# Patient Record
Sex: FEMALE | Race: WHITE | NOT HISPANIC OR LATINO | Employment: FULL TIME | URBAN - METROPOLITAN AREA
[De-identification: names, ages, dates, MRNs, and addresses within clinical notes are randomized per-mention and may not be internally consistent; named-entity substitution may affect disease eponyms.]

---

## 2018-03-31 ENCOUNTER — HOSPITAL ENCOUNTER (INPATIENT)
Facility: CLINIC | Age: 23
LOS: 17 days | Discharge: HOME | DRG: 885 | End: 2018-04-18
Attending: PSYCHIATRY & NEUROLOGY | Admitting: PSYCHIATRY & NEUROLOGY
Payer: COMMERCIAL

## 2018-03-31 DIAGNOSIS — F12.10 CANNABIS ABUSE, CONTINUOUS: ICD-10-CM

## 2018-03-31 DIAGNOSIS — F14.90 COCAINE USE: ICD-10-CM

## 2018-03-31 DIAGNOSIS — F39 EPISODIC MOOD DISORDER (H): ICD-10-CM

## 2018-03-31 DIAGNOSIS — R45.851 SUICIDAL IDEATION: ICD-10-CM

## 2018-03-31 DIAGNOSIS — Z72.0 TOBACCO ABUSE: Primary | ICD-10-CM

## 2018-03-31 DIAGNOSIS — F32.89 MINOR DEPRESSIVE DISORDER: ICD-10-CM

## 2018-03-31 DIAGNOSIS — F12.90 MARIJUANA USE: ICD-10-CM

## 2018-03-31 DIAGNOSIS — F14.159: ICD-10-CM

## 2018-03-31 LAB
AMPHETAMINES UR QL SCN: NEGATIVE
BARBITURATES UR QL: NEGATIVE
BENZODIAZ UR QL: NEGATIVE
CANNABINOIDS UR QL SCN: POSITIVE
COCAINE UR QL: POSITIVE
ETHANOL UR QL SCN: NEGATIVE
HCG UR QL: NEGATIVE
OPIATES UR QL SCN: NEGATIVE

## 2018-03-31 PROCEDURE — 99285 EMERGENCY DEPT VISIT HI MDM: CPT | Mod: Z6 | Performed by: PSYCHIATRY & NEUROLOGY

## 2018-03-31 PROCEDURE — 99285 EMERGENCY DEPT VISIT HI MDM: CPT | Mod: 25 | Performed by: PSYCHIATRY & NEUROLOGY

## 2018-03-31 PROCEDURE — 96360 HYDRATION IV INFUSION INIT: CPT | Performed by: PSYCHIATRY & NEUROLOGY

## 2018-03-31 PROCEDURE — 81025 URINE PREGNANCY TEST: CPT | Performed by: PSYCHIATRY & NEUROLOGY

## 2018-03-31 PROCEDURE — 90791 PSYCH DIAGNOSTIC EVALUATION: CPT

## 2018-03-31 PROCEDURE — 96361 HYDRATE IV INFUSION ADD-ON: CPT | Performed by: PSYCHIATRY & NEUROLOGY

## 2018-03-31 PROCEDURE — 80320 DRUG SCREEN QUANTALCOHOLS: CPT | Performed by: PSYCHIATRY & NEUROLOGY

## 2018-03-31 PROCEDURE — 80307 DRUG TEST PRSMV CHEM ANLYZR: CPT | Performed by: PSYCHIATRY & NEUROLOGY

## 2018-03-31 ASSESSMENT — ENCOUNTER SYMPTOMS
BACK PAIN: 0
FEVER: 0
DYSPHORIC MOOD: 1
HALLUCINATIONS: 1
ABDOMINAL PAIN: 0
SHORTNESS OF BREATH: 0
DIZZINESS: 0
CHEST TIGHTNESS: 0
NERVOUS/ANXIOUS: 1

## 2018-03-31 NOTE — IP AVS SNAPSHOT
MRN:[id number]                      After Visit Summary   3/31/2018    Palma Kim    MRN: [id number]           Patient Information     Date Of Birth          1995        Designated Caregiver       Most Recent Value    Caregiver    Will someone help with your care after discharge? no      About your hospital stay     You were admitted on:  April 1, 2018 You last received care in the:  [facility]    You were discharged on:  April 18, 2018       Who to Call     For medical emergencies, please call 911.  For non-urgent questions about your medical care, please call your primary care provider or clinic, [phone]          Attending Provider     Provider Specialty    Andrea Sanchez MD Emergency Medicine    Heather, Erma Turner MD Psychiatry    Dongre, Dinesh Mckeon MD Psychiatry       Primary Care Provider Office Phone # Fax #    Teodora SimsHEMA [phone]      Further instructions from your care team       ..Behavioral Discharge Planning and Instructions      Summary: You were admitted on 3/31/2018 to Station [address] [place] [address] due to Suicidal Ideation.  You were treated by  Erma Romero MD. and discharged on 04/18/2018 to Substance Abuse Treatment Program [facility]     Principal Diagnosis:   Psychosis, unspecified (primary psychotic illness vs schizoaffective disorder vs substance induced)  Substance induced mood disorder  Cocaine Use Disorder, moderate to severe  Cannabis Use Disorder, severe    [facility]    Health Care Follow-up Appointments:   Inpatient Treatment  Date: 04/18/2018  Time:       Provider: Iraj Levin  Address: [facility] [place] [address] [place] [address]  Phone:[phone]   [facility] has faxed the Discharge Summary and AVS to this provider at Fax: [phone]        Attend all scheduled appointments with your outpatient providers. Call at least 24 hours in advance if you need to reschedule an  "appointment to ensure continued access to your outpatient providers.   Major Treatments, Procedures and Findings: You were provided with: a psychiatric assessment, assessed for medical stability, medication evaluation and/or management, group therapy, art therapy, milieu management, medical interventions and skills/OT groups.    Symptoms to Report: If you experience any of the following symptoms please report them right away to your provider or to family/friends; feeling more aggressive, increased confusion, losing more sleep, mood getting worse or thoughts of suicide.    Early warning signs can include: Early warning signs that could signal a potential relapse could include but not limited to the following; increased depression or anxiety sleep disturbances increased thoughts or behaviors of suicide or self-harm increased unusual thinking, such as paranoia or hearing voices.     Safety and Wellness:  Take all medicines as directed.  Make no changes unless your doctor suggests them. Follow treatment recommendations.  Refrain from alcohol and non-prescribed drugs. If there is a concern for safety, call 911.    Resources: Mental health crisis response for your [facility] is offered 24 hours a day, 7 days a week. A trained counselor will assess your current situation, offer support and counseling and connect you with local resources. Please call  [facility] Crisis Response [phone]    Crisis Intervention: 108.388.8390 or 594-627-8702 (TTY: 466.236.7120).  Call anytime for help.  [employer] [place] on Mental Illness (www.[place].amrtina.org): 817.941.4819 or 661-549-4049.  Mental Health Consumer/Survivor Network of [place] (www.mhcsn.net): 988.286.5563 or 092-581-6708  Mental Health Association of [place] (www.mentalhealth.org): 663.461.4517 or 201-610-0297  Self- Management and Recovery Training., SMART-- Toll free: 347.969.9910  www.[employer]iveWorx  Text 4 Life: txt \"LIFE\" to 37575 for immediate support and crisis " "intervention  Crisis text line: Text \"[place]\" to 446461. Free, confidential, .  Crisis Intervention: 527.316.5233 or 482-346-1884. Call anytime for help.     The treatment team has appreciated the opportunity to work with you. Palma, please take care and make your recovery a daily recovery. If you have any questions or concerns our unit number is [phone].  You will be receiving a follow-up phone call within the next three days from a representative from behavioral health.  You have identified the best phone number to reach you as [phone] (home)         Pending Results     No orders found from 3/29/2018 to 2018.            Admission Information     Date & Time Provider Department Dept. Phone    3/31/2018 Dinesh Malik MD Young Adult [facility] [phone]      Your Vitals Were     Blood Pressure Pulse Temperature Respirations Weight Last Period    123/65 78 96.7  F (35.9  C) (Tympanic) 16 66.8 kg (147 lb 3.2 oz) 2018    Pulse Oximetry                   99%           INFRARED IMAGING SYSTEMShart Information     Vgift lets you send messages to your doctor, view your test results, renew your prescriptions, schedule appointments and more. To sign up, go to [email or web address] . Click on \"Log in\" on the left side of the screen, which will take you to the Welcome page. Then click on \"Sign up Now\" on the right side of the page.     You will be asked to enter the access code listed below, as well as some personal information. Please follow the directions to create your username and password.     Your access code is: [email or web address]  Expires: 2018 10:21 AM     Your access code will  in 90 days. If you need help or a new code, please call your [facility] or [phone].        Care EveryWhere ID     This is your Care EveryWhere ID. This could be used by other organizations to access your [place] medical records  [id number]        Equal Access to Services     MANASA CISNEROS AH: Hadii " osito Topete, waaxda luqadaha, qaybta kaalmada adejoaquin, waxtamanna cherie mcclellandmelissazaid meeks. So [facility] [phone].    ATENCIÓN: Si habla español, tiene a mcneil disposición servicios gratuitos de asistencia lingüística. Andreiame al [phone].    We comply with applicable federal civil rights laws and [place] laws. We do not discriminate on the basis of race, color, national origin, age, disability, sex, sexual orientation, or gender identity.               Review of your medicines      START taking        Dose / Directions    hydrOXYzine 50 MG tablet   Commonly known as:  ATARAX   Used for:  Episodic mood disorder (H)        Dose:  50 mg   Take 1 tablet (50 mg) by mouth every 4 hours as needed for anxiety   Quantity:  30 tablet   Refills:  1       nicotine polacrilex 2 MG gum   Commonly known as:  NICORETTE        Dose:  2 mg   Place 1 each (2 mg) inside cheek every hour as needed for smoking cessation   Quantity:  90 tablet   Refills:  1       * OLANZapine 10 MG tablet   Commonly known as:  zyPREXA   Used for:  Episodic mood disorder (H)        Dose:  10 mg   Take 1 tablet (10 mg) by mouth At Bedtime   Quantity:  30 tablet   Refills:  1       * OLANZapine 5 MG tablet   Commonly known as:  zyPREXA   Used for:  Episodic mood disorder (H)        Dose:  5 mg   Take 1 tablet (5 mg) by mouth daily   Quantity:  30 tablet   Refills:  1       * Notice:  This list has 2 medication(s) that are the same as other medications prescribed for you. Read the directions carefully, and ask your doctor or other care provider to review them with you.         Where to get your medicines      These medications were sent to [facility] [place], [place] - [address]  [address] [address]     Phone:  [phone]     hydrOXYzine 50 MG tablet    nicotine polacrilex 2 MG gum    OLANZapine 10 MG tablet    OLANZapine 5 MG tablet                Protect others around you: Learn how to  safely use, store and throw away your medicines at www.disposemymeds.org.             Medication List: This is a list of all your medications and when to take them. Check marks below indicate your daily home schedule. Keep this list as a reference.      Medications           Morning Afternoon Evening Bedtime As Needed    hydrOXYzine 50 MG tablet   Commonly known as:  ATARAX   Take 1 tablet (50 mg) by mouth every 4 hours as needed for anxiety   Last time this was given:  50 mg on 4/15/2018  8:10 PM                                nicotine polacrilex 2 MG gum   Commonly known as:  NICORETTE   Place 1 each (2 mg) inside cheek every hour as needed for smoking cessation   Last time this was given:  2 mg on 4/16/2018  9:30 AM                                * OLANZapine 10 MG tablet   Commonly known as:  zyPREXA   Take 1 tablet (10 mg) by mouth At Bedtime   Last time this was given:  5 mg on 4/16/2018  9:30 AM                                * OLANZapine 5 MG tablet   Commonly known as:  zyPREXA   Take 1 tablet (5 mg) by mouth daily   Last time this was given:  5 mg on 4/16/2018  9:30 AM                                * Notice:  This list has 2 medication(s) that are the same as other medications prescribed for you. Read the directions carefully, and ask your doctor or other care provider to review them with you.

## 2018-03-31 NOTE — IP AVS SNAPSHOT
[facility]    [facility] [address]    [address] [facility] [address]    Phone:  [phone]                                       After Visit Summary   3/31/2018    Palma Kim    MRN: [id number]           After Visit Summary Signature Page     I have received my discharge instructions, and my questions have been answered. I have discussed any challenges I see with this plan with the nurse or doctor.    ..........................................................................................................................................  Patient/Patient Representative Signature      ..........................................................................................................................................  Patient Representative Print Name and Relationship to Patient    ..................................................               ................................................  Date                                            Time    ..........................................................................................................................................  Reviewed by Signature/Title    ...................................................              ..............................................  Date                                                            Time

## 2018-03-31 NOTE — IP AVS SNAPSHOT
YOUNG ADULT INPATIENT MENTAL [facility]: [phone]                                              INTERAGENCY TRANSFER FORM - LAB / IMAGING / EKG / EMG RESULTS   3/31/2018                    Hospital Admission Date: 3/31/2018  RAYMON BURGOS   : 1995  Sex: Female        Attending Provider: Dinesh Malik MD     Allergies:  Penicillins    Infection:  None   Service:  MENTAL HEALT    Ht:  --   Wt:  66.8 kg (147 lb 3.2 oz)   Admission Wt:  65.3 kg (144 lb)    BMI:  --   BSA:  --            Patient PCP Information     Provider PCP Type    HEMA Ponce CNP General      Unresulted Labs     None      Encounter-Level Documents:     There are no encounter-level documents.      Order-Level Documents:     There are no order-level documents.

## 2018-04-01 PROBLEM — F39 MOOD DISORDER (H): Status: ACTIVE | Noted: 2018-04-01

## 2018-04-01 LAB
ALBUMIN SERPL-MCNC: 4.2 G/DL (ref 3.4–5)
ALP SERPL-CCNC: 52 U/L (ref 40–150)
ALT SERPL W P-5'-P-CCNC: 12 U/L (ref 0–50)
ANION GAP SERPL CALCULATED.3IONS-SCNC: 11 MMOL/L (ref 3–14)
AST SERPL W P-5'-P-CCNC: 12 U/L (ref 0–45)
BASOPHILS # BLD AUTO: 0 10E9/L (ref 0–0.2)
BASOPHILS NFR BLD AUTO: 0.4 %
BILIRUB SERPL-MCNC: 0.7 MG/DL (ref 0.2–1.3)
BUN SERPL-MCNC: 9 MG/DL (ref 7–30)
CALCIUM SERPL-MCNC: 8.8 MG/DL (ref 8.5–10.1)
CHLORIDE SERPL-SCNC: 109 MMOL/L (ref 94–109)
CHOLEST SERPL-MCNC: 131 MG/DL
CO2 SERPL-SCNC: 21 MMOL/L (ref 20–32)
CREAT SERPL-MCNC: 0.68 MG/DL (ref 0.52–1.04)
DIFFERENTIAL METHOD BLD: NORMAL
EOSINOPHIL # BLD AUTO: 0.2 10E9/L (ref 0–0.7)
EOSINOPHIL NFR BLD AUTO: 2.4 %
ERYTHROCYTE [DISTWIDTH] IN BLOOD BY AUTOMATED COUNT: 14.5 % (ref 10–15)
GFR SERPL CREATININE-BSD FRML MDRD: >90 ML/MIN/1.7M2
GLUCOSE SERPL-MCNC: 71 MG/DL (ref 70–99)
HCT VFR BLD AUTO: 38 % (ref 35–47)
HDLC SERPL-MCNC: 72 MG/DL
HGB BLD-MCNC: 12.3 G/DL (ref 11.7–15.7)
IMM GRANULOCYTES # BLD: 0 10E9/L (ref 0–0.4)
IMM GRANULOCYTES NFR BLD: 0.3 %
LDLC SERPL CALC-MCNC: 45 MG/DL
LYMPHOCYTES # BLD AUTO: 2.9 10E9/L (ref 0.8–5.3)
LYMPHOCYTES NFR BLD AUTO: 37.3 %
MCH RBC QN AUTO: 29.4 PG (ref 26.5–33)
MCHC RBC AUTO-ENTMCNC: 32.4 G/DL (ref 31.5–36.5)
MCV RBC AUTO: 91 FL (ref 78–100)
MONOCYTES # BLD AUTO: 0.8 10E9/L (ref 0–1.3)
MONOCYTES NFR BLD AUTO: 9.6 %
NEUTROPHILS # BLD AUTO: 3.9 10E9/L (ref 1.6–8.3)
NEUTROPHILS NFR BLD AUTO: 50 %
NONHDLC SERPL-MCNC: 59 MG/DL
NRBC # BLD AUTO: 0 10*3/UL
NRBC BLD AUTO-RTO: 0 /100
PLATELET # BLD AUTO: 307 10E9/L (ref 150–450)
POTASSIUM SERPL-SCNC: 3.6 MMOL/L (ref 3.4–5.3)
PROT SERPL-MCNC: 7.5 G/DL (ref 6.8–8.8)
RBC # BLD AUTO: 4.18 10E12/L (ref 3.8–5.2)
SODIUM SERPL-SCNC: 141 MMOL/L (ref 133–144)
TRIGL SERPL-MCNC: 72 MG/DL
TSH SERPL DL<=0.005 MIU/L-ACNC: 1.38 MU/L (ref 0.4–4)
WBC # BLD AUTO: 7.8 10E9/L (ref 4–11)

## 2018-04-01 PROCEDURE — 80061 LIPID PANEL: CPT | Performed by: PSYCHIATRY & NEUROLOGY

## 2018-04-01 PROCEDURE — 99223 1ST HOSP IP/OBS HIGH 75: CPT | Mod: AI | Performed by: PSYCHIATRY & NEUROLOGY

## 2018-04-01 PROCEDURE — 85025 COMPLETE CBC W/AUTO DIFF WBC: CPT | Performed by: PSYCHIATRY & NEUROLOGY

## 2018-04-01 PROCEDURE — 80053 COMPREHEN METABOLIC PANEL: CPT | Performed by: PSYCHIATRY & NEUROLOGY

## 2018-04-01 PROCEDURE — H2032 ACTIVITY THERAPY, PER 15 MIN: HCPCS

## 2018-04-01 PROCEDURE — 36415 COLL VENOUS BLD VENIPUNCTURE: CPT | Performed by: PSYCHIATRY & NEUROLOGY

## 2018-04-01 PROCEDURE — 84443 ASSAY THYROID STIM HORMONE: CPT | Performed by: PSYCHIATRY & NEUROLOGY

## 2018-04-01 PROCEDURE — 12400007 ZZH R&B MH INTERMEDIATE UMMC

## 2018-04-01 PROCEDURE — 25000132 ZZH RX MED GY IP 250 OP 250 PS 637: Performed by: PSYCHIATRY & NEUROLOGY

## 2018-04-01 RX ORDER — HYDROXYZINE HYDROCHLORIDE 25 MG/1
25 TABLET, FILM COATED ORAL EVERY 4 HOURS PRN
Status: DISCONTINUED | OUTPATIENT
Start: 2018-04-01 | End: 2018-04-02

## 2018-04-01 RX ORDER — OLANZAPINE 5 MG/1
5 TABLET, ORALLY DISINTEGRATING ORAL ONCE
Status: COMPLETED | OUTPATIENT
Start: 2018-04-01 | End: 2018-04-01

## 2018-04-01 RX ORDER — OLANZAPINE 5 MG/1
5 TABLET, ORALLY DISINTEGRATING ORAL AT BEDTIME
Status: DISCONTINUED | OUTPATIENT
Start: 2018-04-01 | End: 2018-04-03

## 2018-04-01 RX ADMIN — OLANZAPINE 5 MG: 5 TABLET, ORALLY DISINTEGRATING ORAL at 12:47

## 2018-04-01 RX ADMIN — NICOTINE POLACRILEX 4 MG: 2 GUM, CHEWING ORAL at 12:47

## 2018-04-01 ASSESSMENT — ACTIVITIES OF DAILY LIVING (ADL)
GROOMING: PROMPTS
DRESS: SCRUBS (BEHAVIORAL HEALTH);PROMPTS
ORAL_HYGIENE: PROMPTS
DRESS: SCRUBS (BEHAVIORAL HEALTH)
LAUNDRY: UNABLE TO COMPLETE
GROOMING: PROMPTS;WITH ASSISTANCE
ORAL_HYGIENE: PROMPTS;WITH ASSISTANCE

## 2018-04-01 NOTE — PROGRESS NOTES
Pt's mother called unit this morning. In chart JABIER was signed for incorrect person therefore writer checked in with Pt before speaking to mom and received verbal confirmation to speak to her. When speaking to mom, mom stated that Pt has recently denied her  side of her mixed identity ( and ) and mom began to cry. Pt verbalized and approval for mom to come during visiting hours. Staff attempted to receive JABIER when Pt was awake, Pt denied. Pt appeared to be disorganized, responding to internal stimuli and having paranoia related to their identity, interactions with others, There is no JABIER signed for mom at this time.

## 2018-04-01 NOTE — PROGRESS NOTES
"   04/01/18 0515   Patient Belongings   Did you bring any home meds/supplements to the hospital?  No   Patient Belongings clothing;necklace;other (see comments)   Disposition of Belongings Locker   Belongings Search Yes     Belongings in Pt Bin:  Shorts, Sweatpants, T-Shirt, Cell Phone, Slippers, Robe, Teresa Pack, Portable Speaker, Phone  (2), Head Phones, 2 necklaces, Breathe Stone, Notebook, [employer] Card, Bag of Small rocks?  Items brought for Patient on 04/17/18: Two books: \" [employer] Start  Of Home\", and \"Your Healing is Killing me\"    A               Admission:  I am responsible for any personal items that are not sent to the safe or pharmacy.  [facility] is not responsible for loss, theft or damage of any property in my possession.    Signature:  _________________________________ Date: _______  Time: _____                                              Staff Signature:  ____________________________ Date: ________  Time: _____      2nd Staff person, if patient is unable/unwilling to sign:    Signature: ________________________________ Date: ________  Time: _____     Discharge:  [facility] has returned all of my personal belongings:    Signature: _________________________________ Date: ________  Time: _____                                          Staff Signature:  ____________________________ Date: ________  Time: _____         "
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
## 2018-04-01 NOTE — PROGRESS NOTES
Patient was lying on the floor at the time--verbalized yes that this writer could talk to her father.  She then gave a vague signature on the JABIER for her father.

## 2018-04-02 PROCEDURE — 99233 SBSQ HOSP IP/OBS HIGH 50: CPT | Performed by: PSYCHIATRY & NEUROLOGY

## 2018-04-02 PROCEDURE — 12400003 ZZH R&B MH CRITICAL UMMC

## 2018-04-02 PROCEDURE — 25000132 ZZH RX MED GY IP 250 OP 250 PS 637: Performed by: NURSE PRACTITIONER

## 2018-04-02 PROCEDURE — 25000132 ZZH RX MED GY IP 250 OP 250 PS 637: Performed by: PSYCHIATRY & NEUROLOGY

## 2018-04-02 RX ORDER — OLANZAPINE 5 MG/1
5-10 TABLET ORAL
Status: DISCONTINUED | OUTPATIENT
Start: 2018-04-02 | End: 2018-04-02

## 2018-04-02 RX ORDER — OLANZAPINE 10 MG/2ML
5-10 INJECTION, POWDER, FOR SOLUTION INTRAMUSCULAR
Status: DISCONTINUED | OUTPATIENT
Start: 2018-04-02 | End: 2018-04-18 | Stop reason: HOSPADM

## 2018-04-02 RX ORDER — HYDROXYZINE HYDROCHLORIDE 50 MG/1
50 TABLET, FILM COATED ORAL EVERY 4 HOURS PRN
Status: DISCONTINUED | OUTPATIENT
Start: 2018-04-02 | End: 2018-04-18 | Stop reason: HOSPADM

## 2018-04-02 RX ORDER — OLANZAPINE 5 MG/1
5-10 TABLET ORAL
Status: DISCONTINUED | OUTPATIENT
Start: 2018-04-02 | End: 2018-04-18 | Stop reason: HOSPADM

## 2018-04-02 RX ADMIN — OLANZAPINE 5 MG: 5 TABLET, ORALLY DISINTEGRATING ORAL at 19:00

## 2018-04-02 RX ADMIN — HYDROXYZINE HYDROCHLORIDE 25 MG: 25 TABLET, FILM COATED ORAL at 17:48

## 2018-04-02 RX ADMIN — HYDROXYZINE HYDROCHLORIDE 50 MG: 50 TABLET, FILM COATED ORAL at 23:15

## 2018-04-02 RX ADMIN — OLANZAPINE 10 MG: 5 TABLET, FILM COATED ORAL at 23:15

## 2018-04-02 ASSESSMENT — ACTIVITIES OF DAILY LIVING (ADL)
ORAL_HYGIENE: PROMPTS
DRESS: SCRUBS (BEHAVIORAL HEALTH)
DRESS: SCRUBS (BEHAVIORAL HEALTH);INDEPENDENT
GROOMING: PROMPTS
ORAL_HYGIENE: PROMPTS
GROOMING: PROMPTS
LAUNDRY: UNABLE TO COMPLETE

## 2018-04-02 NOTE — PROGRESS NOTES
YIMI, Pt was withdrawn,isolated,decline to respond to check in     04/02/18 1349   Behavioral Health   Hallucinations (YIMI, Pt was withdrawn,isolated,decline to respond to checkin)   Thinking (YIMI, Pt was withdrawn,isolated,decline to respond to checkin)   Orientation (YIMI, Pt was withdrawn,isolated,decline to respond to checkin)   Memory (YIMI, Pt was withdrawn,isolated,decline to respond to checkin)   Insight poor  (YIMI, Pt was withdrawn,isolated,decline to respond to checkin)   Judgement impaired  (YIMI, Pt was withdrawn,isolated,decline to respond to checkin)   Eye Contact out of corner of eyes;at examiner   Affect full range affect;irritable;other (see comments)   Mood (YIMI, Pt was withdrawn,isolated,decline to respond to checkin)   Suicidality (YIMI, Pt was withdrawn,isolated,decline to respond to checkin)   1. Wish to be Dead (YIMI, Pt was withdrawn,isolated,decline to respond to checkin)   2. Non-Specific Active Suicidal Thoughts  (YIMI, Pt was withdrawn,isolated,decline to respond to checkin)   Self Injury (YIMI, Pt was withdrawn,isolated,decline to respond to checkin)   Elopement (YIMI, Pt was withdrawn,isolated,decline to respond to checkin)   Activity isolative;withdrawn   Medication Sensitivity (YIMI, Pt was withdrawn,isolated,decline to respond to checkin)   Psychomotor / Gait balanced;steady   Psycho Education   Type of Intervention (YIMI, Pt was withdrawn,isolated,decline to respond to checkin)   Response (YIMI, Pt was withdrawn,isolated,decline to respond to checkin)   Hours (YIMI, Pt was withdrawn,isolated,decline to respond to checkin)   Activities of Daily Living   Hygiene/Grooming prompts   Oral Hygiene prompts   Dress scrubs (behavioral health);independent   Laundry unable to complete   Room Organization independent   Activity   Activity Assistance Provided independent     Flow sheet based on observation only

## 2018-04-02 NOTE — PROGRESS NOTES
"   04/01/18 2000   Behavioral Health   Hallucinations other (see comment)  ([place])   Thinking other (see comment)  ([place])   Orientation other (see comment)  ([place])   Memory other (see comment)  ([place])   Insight poor   Judgement impaired   Eye Contact out of corner of eyes   Affect irritable   Mood other (see comments)  ([place])   Physical Appearance/Attire attire appropriate to age and situation   Hygiene neglected grooming - unclean body, hair, teeth   Suicidality other (see comments)  ([place])   1. Wish to be Dead No   2. Non-Specific Active Suicidal Thoughts  No   Self Injury other (see comment)  ([place])   Elopement (no concerns)   Activity isolative   Speech other (see comments)  ([place])   Medication Sensitivity no stated side effects;no observed side effects   Psychomotor / Gait balanced;steady   Activities of Daily Living   Hygiene/Grooming prompts   Oral Hygiene prompts   Dress scrubs (behavioral health)   Room Organization prompts     Pt was isolated to her room and appeared to be sleeping all evening. Pt refused dinner and group. Pt refused medication and stated she has been \"sleeping just fine.\" Writer was unable to check-in with pt.  "
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
## 2018-04-03 PROCEDURE — 99233 SBSQ HOSP IP/OBS HIGH 50: CPT | Performed by: PSYCHIATRY & NEUROLOGY

## 2018-04-03 PROCEDURE — 12400003 ZZH R&B MH CRITICAL UMMC

## 2018-04-03 PROCEDURE — 25000132 ZZH RX MED GY IP 250 OP 250 PS 637: Performed by: NURSE PRACTITIONER

## 2018-04-03 PROCEDURE — H2032 ACTIVITY THERAPY, PER 15 MIN: HCPCS

## 2018-04-03 PROCEDURE — 25000132 ZZH RX MED GY IP 250 OP 250 PS 637: Performed by: PSYCHIATRY & NEUROLOGY

## 2018-04-03 RX ORDER — OLANZAPINE 5 MG/1
5 TABLET, ORALLY DISINTEGRATING ORAL 2 TIMES DAILY
Status: DISCONTINUED | OUTPATIENT
Start: 2018-04-03 | End: 2018-04-16

## 2018-04-03 RX ADMIN — OLANZAPINE 5 MG: 5 TABLET, ORALLY DISINTEGRATING ORAL at 20:25

## 2018-04-03 RX ADMIN — OLANZAPINE 5 MG: 5 TABLET, ORALLY DISINTEGRATING ORAL at 10:30

## 2018-04-03 RX ADMIN — OLANZAPINE 10 MG: 5 TABLET, FILM COATED ORAL at 01:08

## 2018-04-03 RX ADMIN — NICOTINE POLACRILEX 2 MG: 2 GUM, CHEWING ORAL at 20:25

## 2018-04-03 ASSESSMENT — ACTIVITIES OF DAILY LIVING (ADL)
DRESS: SCRUBS (BEHAVIORAL HEALTH)
GROOMING: INDEPENDENT
ORAL_HYGIENE: INDEPENDENT

## 2018-04-03 NOTE — PROGRESS NOTES
"Dicussed with patient his/her Personal Plan of Care.      \"Reasons you are in the hospital;\" The patient identifies the following reasons for current hospitalization:   Hearing Voices  Seeing people as someone else.  Trust issues      \"Goals for Discharge\" The patient identifies the following goals for discharge:  Behave well, Be cordial  Eat on time, everyday w/water  Take my medication daily    "

## 2018-04-03 NOTE — PLAN OF CARE
Problem: Patient Care Overview  Goal: Individualization & Mutuality  BEHAVIORAL TEAM DISCUSSION    Participants: 4A Provider: Debra Naegele, APRN, CNS and Dr. Erma Romero MD; 4A RN's: America Valladares, RAYNA and Madyson Grimaldo, RN; 4A CTC's: Shanika Cespedes (CTC) and Radha Hagen (CTC).  Progress: No Change.  Continued Stay Criteria/Rationale: Suicidal Ideation  Medical/Physical: Deferred (see medical notes).  Precautions:    Behavioral Orders   Procedures     Code 1 - Restrict to Unit     Elopement precautions     Routine Programming     As clinically indicated     Status 15     Every 15 minutes.     Status Individual Observation     Visually monitor pt.  Attempted SI with a towel around her neck     Order Specific Question:   CONTINUOUS 24 hours / day     Answer:   Distance and Exceptions     Order Specific Question:   Distance     Answer:   5 feet     Order Specific Question:   Exceptions     Answer:   none     Order Specific Question:   WHILE AWAKE     Answer:   Distance and Exceptions     Order Specific Question:   Distance     Answer:   5 feet     Order Specific Question:   Exceptions     Answer:   none     Order Specific Question:   AT NIGHT     Answer:   Distance and Exceptions     Order Specific Question:   Distance     Answer:   Other     Order Specific Question:   Specify distance     Answer:   monitor within visual distance     Order Specific Question:   Exceptions     Answer:   none     Suicide precautions     Patients on Suicide Precautions should have a Combination Diet ordered that includes a Diet selection(s) AND a Behavioral Tray selection for Safe Tray - with utensils, or Safe Tray - NO utensils       Plan: The following services will be provided to the patient; psychiatric assessment, medication management, therapeutic milieu, individual and group support, art therapy, and skills/OT groups.   Rationale for change in precautions or plan: N/A

## 2018-04-03 NOTE — PROGRESS NOTES
"Pt was out in the hallway and began to have an emesis.  Pt was redirected to her room by a psych assoc.  This nurse was informed.  This nurse went to pt room immediately and found pt on her bed with a towel hugo-crossed around her neck and she was pulling it tight.  Pt had been yelling moments before that, \"get me out of here\".  The towel was removed and pt began to cry and stated, \"My dad said he would help get me out of here and he didn't.  He just left and I am still here\".  Reassured pt and sat with her until she calmed.  Small emesis noted in the toilet.    Began 1:1 monitoring immediately and placed calls to Nursing supervisor, On-call MD America Feldman and Staffing.  All bedding and linens have been removed from pt's room.      Pt up ambulating out on the unit.  Pt remains paranoid and disorganized.  Pt asked for a piece of nicotine gum and refused when asked to have her name band scanned for med administration.      1:1 monitoring continuous.  "
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
## 2018-04-03 NOTE — PROGRESS NOTES
Writer faxed pre-petition documents to [facility] as [facility] states the pt has not lived in the county long enough and her information has not switched over to their county.

## 2018-04-03 NOTE — PROGRESS NOTES
"Pt came out of her room with blanket covering her with no shirt on underneath the blanket. Pt proceed to walk into another patients room. It was difficult to redirect pt out of the room. Pt stated \"I don't trust you\". Pt did come out of the other patients room with staff redirection. Pt at first pt would not go to her room to put on a shirt. She stated \"someone  in there I saw it\", \"I want to see my dad\" (pt dad came to visit and had left around 30 minutes before this incident), \"I want to go home\". Pt did go eventually to her room and put on a shirt and a sweater. Pt asked for her HS medications. Pt received her HS 5 mg Zyprexa at 1900. Pt is currently calm and cooperative. Pt is out in the milieu watching a movie. Will continue to monitor and reassess.  "

## 2018-04-03 NOTE — PROGRESS NOTES
"   18   Behavioral Health   Hallucinations appears responding   Thinking confused;paranoid   Orientation person: oriented;place, disoriented   Memory confabulation   Insight poor   Judgement impaired   Eye Contact at examiner;out of corner of eyes   Affect incongruent;tense;sad   Mood anxious;labile   Physical Appearance/Attire attire appropriate to age and situation   Hygiene neglected grooming - unclean body, hair, teeth   Suicidality (pt was observed holding towel around her neck)   1. Wish to be Dead ([place])   2. Non-Specific Active Suicidal Thoughts  ([place])   Self Injury other (see comment)  (none reported or observed)   Elopement Statements about wanting to leave   Activity isolative;withdrawn   Speech flight of ideas;tangential;rambling   Medication Sensitivity no stated side effects;no observed side effects   Psychomotor / Gait balanced;steady   Activities of Daily Living   Hygiene/Grooming prompts   Oral Hygiene prompts   Dress scrubs (behavioral health)   Room Organization prompts     Pt reported earlier in the shift that she is feeling \"bad, scared and anxious.\" Pt ate dinner in her room. Pt asked staff too stay with her because she felt scared. Pt had a visit with her dad in her room and afterwards stated \"that wasn't my dad.\" Pt appears confused with incongruent expressions such as shaking head when saying \"yes.\" Pt reported she wanted to leave and asked \"how do you open these windows so I can leave?\" Pt walked into another pts room while she was wrapped in a blanket with no clothes on underneath. Pt was escorted to room and staff asked her to put on clothes. Pt stated \"you told me to take my clothes off!\" Pt was given new scrubs and underwear. Pt reported \"did you know someone  in my room? I saw her die right next to me!\" Pt appeared tense, tearful and restless.   "

## 2018-04-03 NOTE — PROGRESS NOTES
"Pt was given morning medications in her room. When asked how her evening went she reported \"fine\". Writer asked about the 1:1 staff and pt said \"I don't know why they are here. They switch out every hour. I must be on an intensive watch\" . This writer then asked specifically what happened with the towel \"they took it away\" \"I guess I was using it incorrectly.\" \"I just need to do something so I can get out of here\" Writer stressed the need to exhibit safe behavior, attend groups, and use of healthy coping skills in order to be able to leave. She reported her visit with her dad went well and she refused to sign an JABIER for mom.     She rates anxiety and depression a 3-4. When asked if she hears voices she did not respond at first when asked again she said \"yes,  I just have them\" Patient stopped speaking after that and worked on discharge paperwork. She is eating roughly 50% of meals and is med compliant. Will continue to assess   "

## 2018-04-03 NOTE — PROGRESS NOTES
Pt had minimal visibility in the milieu, and reported that she feels okay, and confused. She denies SI, SIB, depression,and rated her mood at two out of ten. Pt stated that she hears voices and sees things that tell her  how to get out or  run away from the Unit. She reported that she wants to get discharged from the hospital because she is confused and do not believe medications will help her. She stated that she had a terrible sleep last night, and no appetite to eat. According to Pt, she is sensitive to Dairy products, does not eat meat, and wheat.  She will drink coconut or rice milk.This write informed the RN and she followed up with patient.  Overall, patient appears restless, anxious,displays blunted affect, socially withdrawn, confused, and receptive to redirections.       04/03/18 1848   Behavioral Health   Hallucinations auditory;visual   Thinking distractable;confused   Orientation person: oriented   Memory baseline memory   Insight poor   Judgement impaired   Eye Contact at examiner   Affect blunted, flat   Mood anxious   Physical Appearance/Attire appears stated age   Hygiene neglected grooming - unclean body, hair, teeth   Suicidality other (see comments)  (Pt denies)   1. Wish to be Dead No   2. Non-Specific Active Suicidal Thoughts  No   Self Injury other (see comment)  (Pt denies)   Elopement Statements about wanting to leave   Activity withdrawn   Speech clear   Psychomotor / Gait balanced   Sleep/Rest/Relaxation   Day/Evening Time Hours up all shift;resting in bed   Number of hours resting in bed 1 hours   Safety   Suicidality Status 15;Room door open;SIO (Status Individual Observation)  (NOTE - order will specify distance   Elopement status 15;no shoes;status continuous sight;room away from unit doors;behavioral scrubs (pajamas);signs posted on unit entrance / exit doors   Coping/Psychosocial   Verbalized Emotional State acceptance;anxiety;frustration;powerlessness   Activities of Daily Living    Hygiene/Grooming independent   Oral Hygiene independent   Dress scrubs (behavioral health)   Room Organization independent   Activity   Activity Assistance Provided independent

## 2018-04-04 PROCEDURE — 25000132 ZZH RX MED GY IP 250 OP 250 PS 637: Performed by: PSYCHIATRY & NEUROLOGY

## 2018-04-04 PROCEDURE — 12400003 ZZH R&B MH CRITICAL UMMC

## 2018-04-04 PROCEDURE — H2032 ACTIVITY THERAPY, PER 15 MIN: HCPCS

## 2018-04-04 RX ADMIN — NICOTINE POLACRILEX 2 MG: 2 GUM, CHEWING ORAL at 20:52

## 2018-04-04 RX ADMIN — NICOTINE POLACRILEX 2 MG: 2 GUM, CHEWING ORAL at 10:57

## 2018-04-04 RX ADMIN — OLANZAPINE 5 MG: 5 TABLET, ORALLY DISINTEGRATING ORAL at 19:31

## 2018-04-04 RX ADMIN — OLANZAPINE 5 MG: 5 TABLET, ORALLY DISINTEGRATING ORAL at 08:53

## 2018-04-04 ASSESSMENT — ACTIVITIES OF DAILY LIVING (ADL)
LAUNDRY: UNABLE TO COMPLETE
LAUNDRY: WITH SUPERVISION
ORAL_HYGIENE: INDEPENDENT
DRESS: SCRUBS (BEHAVIORAL HEALTH)
GROOMING: INDEPENDENT
GROOMING: INDEPENDENT
ORAL_HYGIENE: INDEPENDENT
DRESS: SCRUBS (BEHAVIORAL HEALTH)

## 2018-04-04 NOTE — PROGRESS NOTES
Art Therapy   Type of Intervention structured groups   Response participates with encouragement   Hours 3   Groups today included:  kirsten millan snow  DBT mini book and free choice art.  Pt was a leader in group, she had a few redirections for drug conversation. She led a group in mini book making, she asked writer if she could do that. She asked politely for supplies. She is writing down uncensored thoughts that come to her. She was able to choose appropriate music and be engaged in group. He presentation was less disorganized and confused. The only issue , she felt she couldn't remember the book making process and she said she felt confused, but then went on to figure it out and show group a second time.

## 2018-04-04 NOTE — PROGRESS NOTES
48 hour: Pt received her zyprexa this morning without incident. Pt was on SIO but will attempt to take her off for day shift but continue the SIO for evenings as this seems to be a more difficult time for this pt. Will continue to assess.

## 2018-04-04 NOTE — PROGRESS NOTES
Pt. Demonstrates improving insight, stating she understands she was put on SIO because of suicidal and disorganized behavior, which she reports she no longer has. Pt. Continues to endorse auditory hallucinations, but reports they have improved. Presently, the auditory hallucinations are commentary. She attended community meeting this morning, but otherwise did not attend groups today. She showered today and ate both breakfast and lunch. She was encouraged to come to staff if she felt she needed additional support, which Pt. Agreed to.       04/04/18 1316   Behavioral Health   Hallucinations auditory   Thinking distractable   Orientation person: oriented;place: oriented   Memory baseline memory   Insight other (see comment)  (fair)   Judgement impaired   Eye Contact at examiner   Affect blunted, flat   Mood anxious   Physical Appearance/Attire attire appropriate to age and situation   Hygiene well groomed   Suicidality other (see comments)  (denies)   1. Wish to be Dead No   2. Non-Specific Active Suicidal Thoughts  No   Self Injury other (see comment)  (denies)   Elopement (Pt. made no comments about wanting to leave. )   Activity withdrawn;other (see comment)  (Occasionally in milieu, kept mostly to herself. )   Speech clear;coherent   Medication Sensitivity no stated side effects   Psychomotor / Gait balanced;steady   Coping/Psychosocial   Verbalized Emotional State acceptance;anxiety;hopefulness   Psycho Education   Type of Intervention 1:1 intervention   Response participates, initiates socially appropriate   Hours 0.5   Treatment Detail 1:1 check-in   Activities of Daily Living   Hygiene/Grooming independent   Oral Hygiene independent   Dress scrubs (behavioral health)   Laundry unable to complete   Room Organization independent

## 2018-04-04 NOTE — PROGRESS NOTES
"   04/03/18 2000   Art Therapy   Type of Intervention structured groups   Response participates with cues/redirection   Hours 1   Pt attended groups for short times. She was disorganized and a bit overwhelmed by the group process and size. There was one question that was asked in group about radical acceptance. She said she was having a difficult time accepting her \"skin color.\" Writer discussed with her what would it be like to love yourself and accept your uniqueness. She was talking about white privilege. Writer asked her to return tomorrow and maybe draw about her feelings and what it would fel like to accept and love herself.  "

## 2018-04-05 PROCEDURE — 97150 GROUP THERAPEUTIC PROCEDURES: CPT | Mod: GO

## 2018-04-05 PROCEDURE — 93005 ELECTROCARDIOGRAM TRACING: CPT

## 2018-04-05 PROCEDURE — 99232 SBSQ HOSP IP/OBS MODERATE 35: CPT | Performed by: PSYCHIATRY & NEUROLOGY

## 2018-04-05 PROCEDURE — 25000132 ZZH RX MED GY IP 250 OP 250 PS 637: Performed by: PSYCHIATRY & NEUROLOGY

## 2018-04-05 PROCEDURE — 90853 GROUP PSYCHOTHERAPY: CPT

## 2018-04-05 PROCEDURE — 12400003 ZZH R&B MH CRITICAL UMMC

## 2018-04-05 RX ADMIN — NICOTINE POLACRILEX 2 MG: 2 GUM, CHEWING ORAL at 09:48

## 2018-04-05 RX ADMIN — NICOTINE POLACRILEX 2 MG: 2 GUM, CHEWING ORAL at 17:44

## 2018-04-05 RX ADMIN — NICOTINE POLACRILEX 4 MG: 2 GUM, CHEWING ORAL at 13:37

## 2018-04-05 RX ADMIN — OLANZAPINE 5 MG: 5 TABLET, ORALLY DISINTEGRATING ORAL at 09:14

## 2018-04-05 RX ADMIN — OLANZAPINE 5 MG: 5 TABLET, ORALLY DISINTEGRATING ORAL at 20:18

## 2018-04-05 ASSESSMENT — ACTIVITIES OF DAILY LIVING (ADL)
ORAL_HYGIENE: INDEPENDENT
DRESS: INDEPENDENT;SCRUBS (BEHAVIORAL HEALTH)
ORAL_HYGIENE: INDEPENDENT
LAUNDRY: WITH SUPERVISION
DRESS: INDEPENDENT
HYGIENE/GROOMING: INDEPENDENT
GROOMING: HANDWASHING;INDEPENDENT;SHOWER

## 2018-04-05 NOTE — PROGRESS NOTES
"Pt slept for the first half of the shift, after which she stated she felt \"much better.\" Pt was then visible in the milieu and engaged in unit activities with peers and staff. Pt denies SI/SIB, and reports, \"overall I'm doing really good. I'm ready to go home soon and I'm glad I came.\"   "
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
## 2018-04-05 NOTE — PROGRESS NOTES
Writer returned call from pt's father who is requesting a family meeting when pt's treatment plan is complete. He states that he just wants to make sure everyone is on the same page.

## 2018-04-05 NOTE — PROGRESS NOTES
"Dicussed with patient his/her Personal Plan of Care.      \"Reasons you are in the hospital;\" The patient identifies the following reasons for current hospitalization:   Hearing voices (good & bad)  Seeing people a someone/thing else      \"Goals for Discharge\" The patient identifies the following goals for discharge:  Be on my best behavior>respect  Eat on time & stay hydrated  Take my medication daily    "

## 2018-04-05 NOTE — PROGRESS NOTES
Attendance: Pt. Attended scheduled 2 of 2 OT sessions today.   OT: pt had positive affect and remained engaged in OT group for full duration of 1 hour sessions. Pt self-initiated tasks and engaged in discussion with peers and staff.      04/05/18 1300   Occupational Therapy   Type of Intervention structured groups   Response Participates   Hours 2

## 2018-04-05 NOTE — PROGRESS NOTES
Pt woke in a timely manner, ate breakfast and participated in community meeting.    Early in the shift pt stated that she found some of her peers topics of conversation off-putting.  Later pt stated that she appreciated staff intervening with her peers and that she felt better as the day progressed.  Pt stated that she continued to have paranoid thoughts but that she was able to recognize that her thoughts did not accurately reflect reality.  Pt stated that she feels safe on the unit and that she can come to staff with any concerns.  Pt denied any hallucinations and stated that she could come to staff if her paranoia or other symptoms worsened.    Pt was present in multiple groups and stated that she found the time beneficial.       04/05/18 1401   Behavioral Health   Hallucinations denies / not responding to hallucinations   Thinking poor concentration   Orientation person: oriented;place: oriented   Memory baseline memory   Insight insight appropriate to events   Judgement impaired   Eye Contact at examiner   Affect full range affect   Mood mood is calm   Physical Appearance/Attire attire appropriate to age and situation   Hygiene well groomed   Suicidality (pt denies)   1. Wish to be Dead No   2. Non-Specific Active Suicidal Thoughts  No   Self Injury (pt denies)   Elopement (no presenting concerns)   Activity (present and active in milieu)   Speech clear;coherent   Medication Sensitivity no stated side effects;no observed side effects   Psychomotor / Gait balanced;steady   Psycho Education   Type of Intervention 1:1 intervention   Response participates, initiates socially appropriate   Hours 0.5   Treatment Detail (check-in)   Activities of Daily Living   Hygiene/Grooming independent   Oral Hygiene independent   Dress independent   Room Organization independent

## 2018-04-05 NOTE — PROGRESS NOTES
Writer met with pt. Pt states that she knows why she is here and understands why, but she would like to leave. Writer explained that she may have to stay do to her recent attempt and that we just want to make sure she is safe. Writer also spoke with her about obtaining a rule 25 assessment. Pt states that she does not want assistance with her chemical health issues.

## 2018-04-05 NOTE — PROGRESS NOTES
Mother called in wanting information. She states that she spoke with night nurse last night. Writer explained that as of right now the information that she received last night would be the same information that I have, until after our morning meeting.

## 2018-04-06 LAB
B BURGDOR IGG+IGM SER QL: 0.05 (ref 0–0.89)
ERYTHROCYTE [SEDIMENTATION RATE] IN BLOOD BY WESTERGREN METHOD: 8 MM/H (ref 0–20)
FOLATE SERPL-MCNC: 14 NG/ML
HIV 1+2 AB+HIV1 P24 AG SERPL QL IA: NONREACTIVE
INTERPRETATION ECG - MUSE: NORMAL
T PALLIDUM IGG+IGM SER QL: NEGATIVE
VIT B12 SERPL-MCNC: 590 PG/ML (ref 193–986)

## 2018-04-06 PROCEDURE — 36415 COLL VENOUS BLD VENIPUNCTURE: CPT | Performed by: PSYCHIATRY & NEUROLOGY

## 2018-04-06 PROCEDURE — 82746 ASSAY OF FOLIC ACID SERUM: CPT | Performed by: PSYCHIATRY & NEUROLOGY

## 2018-04-06 PROCEDURE — 86039 ANTINUCLEAR ANTIBODIES (ANA): CPT | Performed by: PSYCHIATRY & NEUROLOGY

## 2018-04-06 PROCEDURE — 82607 VITAMIN B-12: CPT | Performed by: PSYCHIATRY & NEUROLOGY

## 2018-04-06 PROCEDURE — 82390 ASSAY OF CERULOPLASMIN: CPT | Performed by: PSYCHIATRY & NEUROLOGY

## 2018-04-06 PROCEDURE — 90853 GROUP PSYCHOTHERAPY: CPT

## 2018-04-06 PROCEDURE — 86780 TREPONEMA PALLIDUM: CPT | Performed by: PSYCHIATRY & NEUROLOGY

## 2018-04-06 PROCEDURE — 87389 HIV-1 AG W/HIV-1&-2 AB AG IA: CPT | Performed by: PSYCHIATRY & NEUROLOGY

## 2018-04-06 PROCEDURE — 97150 GROUP THERAPEUTIC PROCEDURES: CPT | Mod: GO

## 2018-04-06 PROCEDURE — 86618 LYME DISEASE ANTIBODY: CPT | Performed by: PSYCHIATRY & NEUROLOGY

## 2018-04-06 PROCEDURE — 99232 SBSQ HOSP IP/OBS MODERATE 35: CPT | Performed by: PSYCHIATRY & NEUROLOGY

## 2018-04-06 PROCEDURE — 87491 CHLMYD TRACH DNA AMP PROBE: CPT | Performed by: PSYCHIATRY & NEUROLOGY

## 2018-04-06 PROCEDURE — 25000132 ZZH RX MED GY IP 250 OP 250 PS 637: Performed by: PSYCHIATRY & NEUROLOGY

## 2018-04-06 PROCEDURE — 87591 N.GONORRHOEAE DNA AMP PROB: CPT | Performed by: PSYCHIATRY & NEUROLOGY

## 2018-04-06 PROCEDURE — 85652 RBC SED RATE AUTOMATED: CPT | Performed by: PSYCHIATRY & NEUROLOGY

## 2018-04-06 PROCEDURE — 86038 ANTINUCLEAR ANTIBODIES: CPT | Performed by: PSYCHIATRY & NEUROLOGY

## 2018-04-06 PROCEDURE — 12400007 ZZH R&B MH INTERMEDIATE UMMC

## 2018-04-06 PROCEDURE — 25000132 ZZH RX MED GY IP 250 OP 250 PS 637: Performed by: NURSE PRACTITIONER

## 2018-04-06 RX ADMIN — NICOTINE POLACRILEX 2 MG: 2 GUM, CHEWING ORAL at 20:34

## 2018-04-06 RX ADMIN — OLANZAPINE 5 MG: 5 TABLET, ORALLY DISINTEGRATING ORAL at 20:34

## 2018-04-06 RX ADMIN — NICOTINE POLACRILEX 2 MG: 2 GUM, CHEWING ORAL at 08:20

## 2018-04-06 RX ADMIN — HYDROXYZINE HYDROCHLORIDE 50 MG: 50 TABLET, FILM COATED ORAL at 08:19

## 2018-04-06 RX ADMIN — NICOTINE POLACRILEX 2 MG: 2 GUM, CHEWING ORAL at 11:00

## 2018-04-06 RX ADMIN — OLANZAPINE 5 MG: 5 TABLET, ORALLY DISINTEGRATING ORAL at 08:18

## 2018-04-06 ASSESSMENT — ACTIVITIES OF DAILY LIVING (ADL)
DRESS: SCRUBS (BEHAVIORAL HEALTH)
HYGIENE/GROOMING: INDEPENDENT
ORAL_HYGIENE: INDEPENDENT
ORAL_HYGIENE: INDEPENDENT
LAUNDRY: WITH SUPERVISION
GROOMING: INDEPENDENT
DRESS: INDEPENDENT

## 2018-04-06 NOTE — PROGRESS NOTES
Attendance: Pt. Attended scheduled 4 of 4 OT sessions today.     OT: pt declined relaxation yoga with  present, though self-initiated participation in OT clinic, pt indicated and advocated for self and requested materials for a particular project and remained engaged in task for duration of OT clinic group. Pt remained positive in affect and encouraging to peers throughout day and engaged in group tasks including identifying alerting and calming coping strategies.     04/06/18 1500   Occupational Therapy   Type of Intervention structured groups   Response Participates   Hours 3.5

## 2018-04-06 NOTE — PROGRESS NOTES
"A peer approached this writer saying that pt stated the following: \"When I get out of here I'm going to get a gun. I don't want them to think it's for suicidal reasons. I need it for other unrelated reasons.\" The peer also indicated that the pt asked for the conversation to remain only among those people in the room. RN notified.  "

## 2018-04-06 NOTE — PROGRESS NOTES
Writer received a call from Cailin from [facility] who states that  will be on a court hold as of 10:01am today 04/06/2018. Cailin stated that the deputy would be out today.
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
## 2018-04-06 NOTE — PROGRESS NOTES
Clinical Nutrition Services Brief Note     Palma is a 22 year old female seen by the dietitian today due to a pt/family request - pt would like help with vegan and gluten free options while in the hospital.     RD met with patient 4/5 and today in attempt to help with requests regarding menu options. Pt denied visit on both attempts, stating she is okay with the menu on Regular diet and no longer desires to meet with RD.    Nutrition Follow-up/Monitoring:  RD to sign off as no nutrition follow-up warranted at this time.  Please consult if further needs arise prior to discharge.    Denisa William RD, LD  Pager #[phone]
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
## 2018-04-06 NOTE — PLAN OF CARE
Problem: Mood Impairment (Depressive Signs/Symptoms) (Adult)  Goal: Improved Mood Symptoms (Depressive Signs/Symptoms)  Patient, while hospitalized, will:  -attend unit programming to develop health coping skills  -be medication compliant   -verbalize an understanding of their medication regimen  -verbalize decrease in depressive signs/symptoms  -will participate in discharge planning  -will seek out staff at urges of self-harm    Patient, prior to dishcharge, will:  -verbalize 2 coping skills that promote mental health   -verbalize absence/decrease of SI/SIB   -develop a safety plan  -will identify a support system     TO PROMOTE SAFETY    Patient identified the following:  Triggers:  ----------  Wellness Strategies:  ----------  Warning Signs:  ----------  Feedback (people they would like to receive feedback from if early warning signs - ex. Friends, family, partner/spouse, support groups, coworkers):  ----------  Taking Action:  ----------  Ways to Chemult:        Self-Reflection & Planning.  Assessed patient's progress completing forms related to Illness Management Recovery (including Personal Plan of Care, Adult Coping Plan, and My Support and Coping Plan) and assisted as needed.    Encouraged patient to continue to consider triggers, wellness strategies, early warning signs, feedback from others, actions to take to prevent relapse, and coping strategies as part of a plan to remain well after leaving the hospital.   48 Hour Nursing Assessment    Patient evaluation continues. Assessed mood, anxiety, thoughts and behavior. Is progressing toward goals. Encourage participation in groups and developing healthy coping skills. Will continue to assess. Patient denies auditory or visual hallucinations. Refer to daily team meeting notes for individualized plan of care.    Pt has been visible and active in the milieu. Pt's SIO was discontinued and pt continues to deny SI.  Pt denies hallucinations, but endorses some paranoia  that other patients are talking about her. Pt states she just tries to remove herself from the situation when this happens. Pt reports painful urination, but denies urgency or any other symptoms. Pt has been calm and cooperative on the unit. She has been taking her medications and denies any side effects.

## 2018-04-06 NOTE — PROGRESS NOTES
"   04/06/18 1300   Behavioral Health   Hallucinations denies / not responding to hallucinations   Thinking intact   Orientation person: oriented;place: oriented;date: oriented;time: oriented   Memory baseline memory   Insight insight appropriate to events;insight appropriate to situation   Judgement intact   Eye Contact at examiner   Affect blunted, flat   Mood mood is calm;anxious   Physical Appearance/Attire attire appropriate to age and situation   Hygiene well groomed   Suicidality other (see comments)  (denies)   1. Wish to be Dead No   2. Non-Specific Active Suicidal Thoughts  No   Self Injury other (see comment)  (denies)   Elopement (none stated/observed)   Activity withdrawn   Speech coherent;clear   Medication Sensitivity no observed side effects;no stated side effects   Psychomotor / Gait balanced;steady   Activities of Daily Living   Hygiene/Grooming independent   Oral Hygiene independent   Dress scrubs (behavioral health)   Laundry with supervision   Room Organization independent     Pt was withdrawn but did participate in groups. Denied SI/SIB. Stated she feels fine but is anxious about having to stay until Tuesday. Is accepting it however. Feels a bit irritated regarding her situation because she just wants to \"feel the sun and smoke a cigarette.\"   "

## 2018-04-07 PROCEDURE — 25000132 ZZH RX MED GY IP 250 OP 250 PS 637: Performed by: NURSE PRACTITIONER

## 2018-04-07 PROCEDURE — 25000132 ZZH RX MED GY IP 250 OP 250 PS 637: Performed by: PSYCHIATRY & NEUROLOGY

## 2018-04-07 PROCEDURE — 12400007 ZZH R&B MH INTERMEDIATE UMMC

## 2018-04-07 PROCEDURE — H2032 ACTIVITY THERAPY, PER 15 MIN: HCPCS

## 2018-04-07 RX ORDER — IBUPROFEN 600 MG/1
600 TABLET, FILM COATED ORAL EVERY 6 HOURS PRN
Status: DISCONTINUED | OUTPATIENT
Start: 2018-04-07 | End: 2018-04-18 | Stop reason: HOSPADM

## 2018-04-07 RX ADMIN — IBUPROFEN 600 MG: 600 TABLET ORAL at 09:00

## 2018-04-07 RX ADMIN — NICOTINE POLACRILEX 2 MG: 2 GUM, CHEWING ORAL at 13:14

## 2018-04-07 RX ADMIN — IBUPROFEN 600 MG: 600 TABLET ORAL at 14:43

## 2018-04-07 RX ADMIN — OLANZAPINE 5 MG: 5 TABLET, ORALLY DISINTEGRATING ORAL at 20:47

## 2018-04-07 RX ADMIN — NICOTINE POLACRILEX 2 MG: 2 GUM, CHEWING ORAL at 18:33

## 2018-04-07 RX ADMIN — NICOTINE POLACRILEX 2 MG: 2 GUM, CHEWING ORAL at 09:01

## 2018-04-07 RX ADMIN — OLANZAPINE 5 MG: 5 TABLET, ORALLY DISINTEGRATING ORAL at 08:30

## 2018-04-07 RX ADMIN — OLANZAPINE 10 MG: 5 TABLET, FILM COATED ORAL at 13:15

## 2018-04-07 ASSESSMENT — ACTIVITIES OF DAILY LIVING (ADL)
TOILETING: 0-->INDEPENDENT
GROOMING: INDEPENDENT
RETIRED_EATING: 0-->INDEPENDENT
GROOMING: INDEPENDENT
DRESS: 0-->INDEPENDENT
PRIOR_FUNCTIONAL_LEVEL_COMMENT: NO
BATHING: 0-->INDEPENDENT
ORAL_HYGIENE: INDEPENDENT
RETIRED_COMMUNICATION: 0-->UNDERSTANDS/COMMUNICATES WITHOUT DIFFICULTY
DRESS: INDEPENDENT
DRESS: INDEPENDENT
COGNITION: 0 - NO COGNITION ISSUES REPORTED
FALL_HISTORY_WITHIN_LAST_SIX_MONTHS: NO
SWALLOWING: 0-->SWALLOWS FOODS/LIQUIDS WITHOUT DIFFICULTY
LAUNDRY: UNABLE TO COMPLETE
ORAL_HYGIENE: INDEPENDENT

## 2018-04-07 NOTE — PROGRESS NOTES
48 hour: CRAMPS: Pt was c/o cramps this am (7 capa). Motrin was ordered and given around 0900. At 1000 pt stated the pain was gone (0 capa)

## 2018-04-07 NOTE — PROGRESS NOTES
Pt was visible and social in milieu for shift. Pt checked in feeling great. Reports embarrassment over her behaviors from Monday night. Pt presented with bright affect and rallied for the movie of the night. Pt reports no concerns at the moment. Pt was visited by mom and dad, visit went well. Denies SI, SIB, HI, AH and VH.        04/06/18 2000   Behavioral Health   Hallucinations denies / not responding to hallucinations   Thinking intact   Orientation person: oriented;place: oriented   Memory baseline memory   Insight insight appropriate to situation   Judgement intact   Eye Contact at examiner   Affect full range affect   Mood mood is calm   Physical Appearance/Attire appears stated age;attire appropriate to age and situation   Hygiene well groomed   Suicidality other (see comments)  (denies)   1. Wish to be Dead No   2. Non-Specific Active Suicidal Thoughts  No   Self Injury other (see comment)  (denies)   Elopement (no concerns)   Activity other (see comment)  (visible and social in milieu)   Speech clear;coherent   Medication Sensitivity no observed side effects;no stated side effects   Psychomotor / Gait balanced;steady   Activities of Daily Living   Hygiene/Grooming independent   Oral Hygiene independent   Dress independent   Room Organization independent

## 2018-04-07 NOTE — PROGRESS NOTES
"Groups: art, coping skills, gratitude and karaoke/ teamwork..  Pt was present in all groups.      Pt was pleasant and cooperative  She is cooperative with writer. She is working on journaling and collaging. She lies \" zines\" and is teaching writer about them. They are political handmade books. She is interested in advocacy and social justice through art and spoken word. She likes being a leader and helping to plan ideas. She led a karoake group and she did a free style rap about 4A, it was very sweet, appropriate and connected her to peers. Writer complimented her for looking and feeling much better than earlier in the week.    04/07/18 1800   Art Therapy   Type of Intervention structured groups   Response participates with encouragement   Hours 3        "

## 2018-04-07 NOTE — PROGRESS NOTES
04/07/18 1200   Behavioral Health   Hallucinations denies / not responding to hallucinations   Thinking intact   Orientation person: oriented;place: oriented;date: oriented;time: oriented   Memory baseline memory   Insight insight appropriate to situation   Judgement intact   Eye Contact at examiner   Affect full range affect   Mood mood is calm   Physical Appearance/Attire appears stated age   Hygiene well groomed   Suicidality other (see comments)  (denies)   1. Wish to be Dead No   2. Non-Specific Active Suicidal Thoughts  No   Self Injury other (see comment)  (denies)   Activity other (see comment)  (visible and social in milieu)   Speech clear;coherent   Medication Sensitivity no observed side effects;no stated side effects   Psychomotor / Gait balanced;steady   Psycho Education   Type of Intervention 1:1 intervention   Response participates, initiates socially appropriate   Hours 0.5   Activities of Daily Living   Hygiene/Grooming independent   Oral Hygiene independent   Dress independent   Room Organization independent     Patient had a good morning; she attended all groups and was social with others throughout the duration of the day shift. Patient denied SI/SIB and stated that she could come talk to staff if her feelings changed. Patient described her morning as 'wonderful' and enjoyed singing songs with other peers. She does not report and concerns at this time.

## 2018-04-08 LAB
C TRACH DNA SPEC QL NAA+PROBE: NEGATIVE
N GONORRHOEA DNA SPEC QL NAA+PROBE: NEGATIVE
SPECIMEN SOURCE: NORMAL
SPECIMEN SOURCE: NORMAL

## 2018-04-08 PROCEDURE — 25000132 ZZH RX MED GY IP 250 OP 250 PS 637: Performed by: PSYCHIATRY & NEUROLOGY

## 2018-04-08 PROCEDURE — H2032 ACTIVITY THERAPY, PER 15 MIN: HCPCS

## 2018-04-08 PROCEDURE — 12400007 ZZH R&B MH INTERMEDIATE UMMC

## 2018-04-08 RX ADMIN — NICOTINE POLACRILEX 2 MG: 2 GUM, CHEWING ORAL at 13:29

## 2018-04-08 RX ADMIN — OLANZAPINE 5 MG: 5 TABLET, ORALLY DISINTEGRATING ORAL at 08:34

## 2018-04-08 RX ADMIN — NICOTINE POLACRILEX 2 MG: 2 GUM, CHEWING ORAL at 10:47

## 2018-04-08 RX ADMIN — OLANZAPINE 5 MG: 5 TABLET, ORALLY DISINTEGRATING ORAL at 20:56

## 2018-04-08 ASSESSMENT — ACTIVITIES OF DAILY LIVING (ADL)
DRESS: INDEPENDENT
HYGIENE/GROOMING: INDEPENDENT
GROOMING: INDEPENDENT
DRESS: INDEPENDENT
ORAL_HYGIENE: INDEPENDENT
ORAL_HYGIENE: INDEPENDENT

## 2018-04-08 NOTE — PROGRESS NOTES
Brief Medicine Note    Called for consultation on this patient for irregular menstrual bleeding (chronic issue) and menstrual cramps.  I ordered Ibuprofen yesterday morning with excellent relief.  Second dose not as effective.  Per RN today, irregular bleeding during her menstrual cycle has been ongoing and is a chronic issue.  VS and labs reviewed, all WNL.    - Start Midol PRN with Ibuprofen  - Apply warm packs  - Strongly encourage outpatient OB/gyn follow up for irregular menstrual cycle, no acute or gynecologic issues at this time    Melva Horowitz, [facility]  Hospitalist Service  Pager [phone]

## 2018-04-08 NOTE — PROGRESS NOTES
Pt woke in a timely manner, ate meals with peers and participated in community meeting and groups.  Pt stated that she feels anxious and took initiative to talk to her RN and this  about finding the resources to address her anxiety.  Pt started to use a new fidget and later stated that she found it helpful.      Pt stated that she was a little anxious about court next week.  Pt stated that even if things went other than she wished she would be able to cope and understood why the treatment team was making their recommendations, even though she stated she would prefer to not go to treatment after this hospitalization.    Pt stated that she felt safe on the unit and demonstrated an ability to come to staff to have her needs met.  Pt denied any desire to harm herself or others.  PT stated that she feels optimistic about her long term prospects.         04/08/18 1155   Behavioral Health   Hallucinations denies / not responding to hallucinations   Thinking intact   Orientation person: oriented;place: oriented;date: oriented   Memory baseline memory   Insight insight appropriate to situation   Judgement intact   Eye Contact at examiner   Affect full range affect   Mood mood is calm   Physical Appearance/Attire appears stated age   Hygiene well groomed   Suicidality (pt denies)   1. Wish to be Dead No   2. Non-Specific Active Suicidal Thoughts  No   Self Injury (pt denies)   Elopement (no presenting concerns)   Activity other (see comment)  (active in milieu)   Speech clear;coherent   Medication Sensitivity no stated side effects;no observed side effects   Psychomotor / Gait balanced;steady   Psycho Education   Type of Intervention 1:1 intervention   Response participates, initiates socially appropriate   Hours 0.5   Activities of Daily Living   Hygiene/Grooming independent   Oral Hygiene independent   Dress independent   Room Organization independent

## 2018-04-08 NOTE — PLAN OF CARE
Problem: Mood Impairment (Depressive Signs/Symptoms) (Adult)  Goal: Improved Mood Symptoms (Depressive Signs/Symptoms)  Patient, while hospitalized, will:  -attend unit programming to develop health coping skills  -be medication compliant   -verbalize an understanding of their medication regimen  -verbalize decrease in depressive signs/symptoms  -will participate in discharge planning  -will seek out staff at urges of self-harm    Patient, prior to dishcharge, will:  -verbalize 2 coping skills that promote mental health   -verbalize absence/decrease of SI/SIB   -develop a safety plan  -will identify a support system     TO PROMOTE SAFETY    Patient identified the following:  Triggers:  ----------  Wellness Strategies:  ----------  Warning Signs:  ----------  Feedback (people they would like to receive feedback from if early warning signs - ex. Friends, family, partner/spouse, support groups, coworkers):  ----------  Taking Action:  ----------  Ways to [place]:        Self-Reflection & Planning.  Assessed patient's progress completing forms related to Illness Management Recovery (including Personal Plan of Care, Adult Coping Plan, and My Support and Coping Plan) and assisted as needed.    Encouraged patient to continue to consider triggers, wellness strategies, early warning signs, feedback from others, actions to take to prevent relapse, and coping strategies as part of a plan to remain well after leaving the hospital.   Outcome: Improving   04/07/18 1385   Improved Mood Symptoms (Depressive Signs/Symptoms)   Improved Mood Symptoms Action Step (STG) Outcome making progress toward outcome   Pt has been calm, social and visible in the milieu.  Pt reports irregular period heavy periods and abdominal cramps which was not managed by Ibuprofen.  On call was updated. IP medical consult was ordered per on call recommendations.  Pt attended and participated in groups.  Denied SI/SIB/Depression/Anxiety.  Pt took a shower this  evening, played cards with peers. Pt went to bed early due to abdominal cramps.  Declined pain medications x 2.  Will continue to monitor.

## 2018-04-09 LAB
ANA PAT SER IF-IMP: ABNORMAL
ANA SER QL IF: ABNORMAL
ANA TITR SER IF: ABNORMAL {TITER}
CERULOPLASMIN SERPL-MCNC: 39 MG/DL (ref 23–53)

## 2018-04-09 PROCEDURE — 12400007 ZZH R&B MH INTERMEDIATE UMMC

## 2018-04-09 PROCEDURE — 25000132 ZZH RX MED GY IP 250 OP 250 PS 637: Performed by: NURSE PRACTITIONER

## 2018-04-09 PROCEDURE — 25000132 ZZH RX MED GY IP 250 OP 250 PS 637: Performed by: PSYCHIATRY & NEUROLOGY

## 2018-04-09 PROCEDURE — H2032 ACTIVITY THERAPY, PER 15 MIN: HCPCS

## 2018-04-09 PROCEDURE — 99232 SBSQ HOSP IP/OBS MODERATE 35: CPT | Performed by: PSYCHIATRY & NEUROLOGY

## 2018-04-09 RX ADMIN — NICOTINE POLACRILEX 2 MG: 2 GUM, CHEWING ORAL at 12:36

## 2018-04-09 RX ADMIN — OLANZAPINE 5 MG: 5 TABLET, ORALLY DISINTEGRATING ORAL at 07:54

## 2018-04-09 RX ADMIN — NICOTINE POLACRILEX 2 MG: 2 GUM, CHEWING ORAL at 16:11

## 2018-04-09 RX ADMIN — NICOTINE POLACRILEX 2 MG: 2 GUM, CHEWING ORAL at 18:53

## 2018-04-09 RX ADMIN — NICOTINE POLACRILEX 2 MG: 2 GUM, CHEWING ORAL at 07:55

## 2018-04-09 RX ADMIN — OLANZAPINE 5 MG: 5 TABLET, ORALLY DISINTEGRATING ORAL at 21:44

## 2018-04-09 RX ADMIN — HYDROXYZINE HYDROCHLORIDE 50 MG: 50 TABLET, FILM COATED ORAL at 22:45

## 2018-04-09 ASSESSMENT — ACTIVITIES OF DAILY LIVING (ADL)
ORAL_HYGIENE: INDEPENDENT
GROOMING: INDEPENDENT
DRESS: STREET CLOTHES;INDEPENDENT
GROOMING: SHOWER;INDEPENDENT

## 2018-04-09 NOTE — PROGRESS NOTES
Writer spoke with pt's mother regarding her commitment. Writers mother states that the pt's plan is to go home and continue outpatient therapy. Writer advised mother that it is out recommendation that she attend inpatient mi/cd treatment. The provider will meet with the pt to discuss cd assessment options and based on what the pt's says other decisions will be made at that time regarding her commitment.
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
## 2018-04-09 NOTE — PLAN OF CARE
Problem: Patient Care Overview  Goal: Individualization & Mutuality  BEHAVIORAL TEAM DISCUSSION    Participants: 4A Provider: Debra Naegele, APRN, CNS and Dr. Erma Romero MD; 4A RN's: Beth Kaur, RN and America Valladares, RAYNA; 4A CTC's: Shanika Cespedes (CTC) and Debra Ponce (CTC).  Progress: Improving.  Continued Stay Criteria/Rationale: Suicidal Ideation  Medical/Physical: Deferred (see medical notes).  Precautions:    Behavioral Orders   Procedures     Code 1 - Restrict to Unit     Routine Programming     As clinically indicated     Status 15     Every 15 minutes.     Suicide precautions     Patients on Suicide Precautions should have a Combination Diet ordered that includes a Diet selection(s) AND a Behavioral Tray selection for Safe Tray - with utensils, or Safe Tray - NO utensils       Plan: The following services will be provided to the patient; psychiatric assessment, medication management, therapeutic milieu, individual and group support, art therapy, and skills/OT groups.   Rationale for change in precautions or plan: N/A

## 2018-04-09 NOTE — PROGRESS NOTES
Palma had a positive evening. She was visible in the milieu, social, and appropriate with others. She attended and participated in groups. She denies mental health symptoms, aside from a brief period of anxiety, which passed quickly after getting some nicotine gum. She is oriented to person, place, date, time, and situation. Full-range affect. Mood was calm throughout the evening.     Appetite: Good  Pain: N/A  Se's: N/A  ADLs: Independent          04/08/18 1900   Behavioral Health   Hallucinations denies / not responding to hallucinations   Thinking intact   Orientation person: oriented;place: oriented;date: oriented   Memory baseline memory   Insight insight appropriate to situation;insight appropriate to events   Judgement intact   Eye Contact at examiner   Affect full range affect   Mood mood is calm   Physical Appearance/Attire appears stated age   Hygiene well groomed   Suicidality other (see comments)  (denies)   1. Wish to be Dead No   2. Non-Specific Active Suicidal Thoughts  No   Change in Protective Factors? No   Enviromental Risk Factors None   Self Injury other (see comment)  (denies)   Elopement (no current concerns)   Activity other (see comment)  (visible)   Speech clear;coherent   Medication Sensitivity no stated side effects;no observed side effects   Psychomotor / Gait balanced;steady   Activities of Daily Living   Hygiene/Grooming independent   Oral Hygiene independent   Dress independent   Room Organization independent

## 2018-04-09 NOTE — PROGRESS NOTES
Pt's  called asking what we wanted explained that provider would like mi/cd treatment, but patient was unwilling to complete an cd assessment.  said they may not be able to get anything done tomorrow and to schedule a cd assessment between now and Thursday.

## 2018-04-10 PROCEDURE — 25000132 ZZH RX MED GY IP 250 OP 250 PS 637: Performed by: PSYCHIATRY & NEUROLOGY

## 2018-04-10 PROCEDURE — 12400007 ZZH R&B MH INTERMEDIATE UMMC

## 2018-04-10 PROCEDURE — 99232 SBSQ HOSP IP/OBS MODERATE 35: CPT | Performed by: PSYCHIATRY & NEUROLOGY

## 2018-04-10 PROCEDURE — 90834 PSYTX W PT 45 MINUTES: CPT

## 2018-04-10 PROCEDURE — H2032 ACTIVITY THERAPY, PER 15 MIN: HCPCS

## 2018-04-10 RX ADMIN — NICOTINE POLACRILEX 4 MG: 2 GUM, CHEWING ORAL at 14:05

## 2018-04-10 RX ADMIN — OLANZAPINE 5 MG: 5 TABLET, ORALLY DISINTEGRATING ORAL at 07:49

## 2018-04-10 RX ADMIN — NICOTINE POLACRILEX 2 MG: 2 GUM, CHEWING ORAL at 18:34

## 2018-04-10 RX ADMIN — OLANZAPINE 5 MG: 5 TABLET, ORALLY DISINTEGRATING ORAL at 20:58

## 2018-04-10 ASSESSMENT — ACTIVITIES OF DAILY LIVING (ADL)
GROOMING: HANDWASHING;INDEPENDENT
ORAL_HYGIENE: INDEPENDENT
DRESS: INDEPENDENT;SCRUBS (BEHAVIORAL HEALTH);STREET CLOTHES
GROOMING: INDEPENDENT
DRESS: INDEPENDENT;SCRUBS (BEHAVIORAL HEALTH)
LAUNDRY: WITH SUPERVISION
ORAL_HYGIENE: INDEPENDENT

## 2018-04-10 NOTE — PROGRESS NOTES
Pt had a good shift. Pt was active on milieu and attended groups. Pt is a little nervous about court tomorrow. Pt is hoping to advocate to discharge and see a therapist instead of inpatient/outpatient. Pt's father visited and stated he and the pt's mother had plans to advocate for her to do treatment. Pt does not know this. Pt is friendly with staff/other pts. Pt feels ready to discharge and do therapy off unit. Denies SI/SIB.    SI: denies  SIB: denies  HALLUCINATIONS: denies  SLEEP: reports okay  APPETITE: typical  HYGIENE: good, showered this shift  NOTABLE: court tomorrow, parents plan to encourage commitment         04/09/18 2300   Behavioral Health   Hallucinations denies / not responding to hallucinations   Thinking intact   Orientation person: oriented;place: oriented;date: oriented;time: oriented   Memory baseline memory   Insight insight appropriate to situation;insight appropriate to events;other (see comment)  (limited)   Judgement intact  (limited)   Eye Contact at examiner   Affect full range affect   Mood mood is calm   Physical Appearance/Attire attire appropriate to age and situation   Hygiene well groomed   Suicidality other (see comments)  (denies)   1. Wish to be Dead No   2. Non-Specific Active Suicidal Thoughts  No   Self Injury other (see comment)  (denies)   Elopement (none stated or observed)   Activity other (see comment)  (active on milieu)   Speech clear;coherent   Medication Sensitivity no stated side effects;no observed side effects   Psychomotor / Gait balanced;steady   Activities of Daily Living   Hygiene/Grooming shower;independent   Oral Hygiene independent   Dress street clothes;independent   Room Organization independent   Activity   Activity Assistance Provided independent

## 2018-04-10 NOTE — PROGRESS NOTES
"   04/10/18 1500   Behavioral Health   Hallucinations denies / not responding to hallucinations   Thinking intact   Orientation person: oriented;place: oriented   Memory baseline memory   Insight insight appropriate to situation   Judgement intact   Eye Contact at examiner   Affect full range affect   Mood mood is calm   Physical Appearance/Attire attire appropriate to age and situation   Hygiene well groomed   Suicidality other (see comments)  (denies)   1. Wish to be Dead No   2. Non-Specific Active Suicidal Thoughts  No   Self Injury other (see comment)  (denies)   Elopement (none stated or observed)   Activity other (see comment)  (denies)   Speech clear;coherent   Medication Sensitivity no stated side effects;no observed side effects   Psychomotor / Gait balanced;steady   Activities of Daily Living   Hygiene/Grooming independent   Oral Hygiene independent   Dress independent;scrubs (behavioral health);street clothes   Room Organization independent     Pt was calm, approachable, and cooperative. Pt had a court hearing today, Pt returned tearful and resistant due to feeling upset. Pt became irritable and reactive to staff asking them to \"back off\". Pt stated \"I'm surrounded by white people and they are always asking me questions and bothering me\", \"we are in a world of white supremacy\". Pt requested to people to Writer most of shift. Pt's mood shifted - feeling more positive, optimistic, ready for next steps even if they are not in Pt's favor. Pt attended and participated in afternoon groups. Pt denied SI and SIB. Pt denied hallucinations/psychotic symptoms.  "
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
## 2018-04-10 NOTE — PROGRESS NOTES
04/09/18 1900   Art Therapy   Type of Intervention structured groups   Response participates with cues/redirection   Hours 3   Pt is engaged and a leader in group. She enjoys collage and was engaged in making a birthday painting for a peer, this was her idea with another peer. She is proactive in selecting music for group as well. She has a good rapport with writer. In a 12 step group, she was opinionated and had to be redirected to listen and not dismiss varied opinions. She took the redirection very well, including telling the person who was having the debate with that she respected his opinion.

## 2018-04-11 PROCEDURE — 12400007 ZZH R&B MH INTERMEDIATE UMMC

## 2018-04-11 PROCEDURE — 25000132 ZZH RX MED GY IP 250 OP 250 PS 637: Performed by: CLINICAL NURSE SPECIALIST

## 2018-04-11 PROCEDURE — 25000132 ZZH RX MED GY IP 250 OP 250 PS 637: Performed by: NURSE PRACTITIONER

## 2018-04-11 PROCEDURE — 90853 GROUP PSYCHOTHERAPY: CPT

## 2018-04-11 PROCEDURE — 25000132 ZZH RX MED GY IP 250 OP 250 PS 637: Performed by: PSYCHIATRY & NEUROLOGY

## 2018-04-11 PROCEDURE — 97150 GROUP THERAPEUTIC PROCEDURES: CPT | Mod: GO

## 2018-04-11 RX ORDER — NICOTINE 21 MG/24HR
1 PATCH, TRANSDERMAL 24 HOURS TRANSDERMAL DAILY
Status: DISCONTINUED | OUTPATIENT
Start: 2018-04-11 | End: 2018-04-18 | Stop reason: HOSPADM

## 2018-04-11 RX ADMIN — OLANZAPINE 5 MG: 5 TABLET, ORALLY DISINTEGRATING ORAL at 21:37

## 2018-04-11 RX ADMIN — NICOTINE 1 PATCH: 14 PATCH, EXTENDED RELEASE TRANSDERMAL at 15:16

## 2018-04-11 RX ADMIN — OLANZAPINE 5 MG: 5 TABLET, FILM COATED ORAL at 15:16

## 2018-04-11 RX ADMIN — OLANZAPINE 5 MG: 5 TABLET, ORALLY DISINTEGRATING ORAL at 08:55

## 2018-04-11 ASSESSMENT — ACTIVITIES OF DAILY LIVING (ADL)
DRESS: INDEPENDENT
GROOMING: INDEPENDENT
ORAL_HYGIENE: INDEPENDENT
DRESS: INDEPENDENT
ORAL_HYGIENE: INDEPENDENT
GROOMING: INDEPENDENT

## 2018-04-11 NOTE — PROGRESS NOTES
Riley Phan called from [facility] requesting a copy of the CD assessment be faxed to [phone].

## 2018-04-11 NOTE — PLAN OF CARE
Problem: Mood Impairment (Depressive Signs/Symptoms) (Adult)  Goal: Improved Mood Symptoms (Depressive Signs/Symptoms)  Patient, while hospitalized, will:  -attend unit programming to develop health coping skills  -be medication compliant   -verbalize an understanding of their medication regimen  -verbalize decrease in depressive signs/symptoms  -will participate in discharge planning  -will seek out staff at urges of self-harm    Patient, prior to dishcharge, will:  -verbalize 2 coping skills that promote mental health   -verbalize absence/decrease of SI/SIB   -develop a safety plan  -will identify a support system     TO PROMOTE SAFETY    Patient identified the following:  Triggers:  ----------  Wellness Strategies:  ----------  Warning Signs:  ----------  Feedback (people they would like to receive feedback from if early warning signs - ex. Friends, family, partner/spouse, support groups, coworkers):  ----------  Taking Action:  ----------  Ways to [place]:        Self-Reflection & Planning.  Assessed patient's progress completing forms related to Illness Management Recovery (including Personal Plan of Care, Adult Coping Plan, and My Support and Coping Plan) and assisted as needed.    Encouraged patient to continue to consider triggers, wellness strategies, early warning signs, feedback from others, actions to take to prevent relapse, and coping strategies as part of a plan to remain well after leaving the hospital.   48 Hour Nursing Assessment    Patient evaluation continues. Assessed mood, anxiety, thoughts and behavior. Is progressing toward goals. Encourage participation in groups and developing healthy coping skills. Will continue to assess. Patient denies auditory or visual hallucinations. Refer to daily team meeting notes for individualized plan of care.    Pt has been visible and active in the milieu. She was pleasant and cooperative this evening. Pt reported feeling sad after a phone call with her  "girlfriend, as girlfriend was not able to come visit tonight.  Pt also reported some frustration about court stating that she does not feel that she needs treatment, but stated, \"I think I'm just going to have to suck it up and go.\" Pt stated she wished she had talked to her doctor earlier, as she would like to know what facility they think is best for her, believing that that might help motivate her. Writer processed these things with pt and also reminded her of the importance of following up with treatment if she is given the option of outpatient. Pt has been taking her medications and denies any side effects. Pt denies any sleep or nutrition concerns. Pt also reports having an irregular period and cramps that have been more painful than normal. Pt is hoping this could be addressed while she is hospitalized, but otherwise stated she would follow up with her OBGYN.          "

## 2018-04-11 NOTE — PROGRESS NOTES
Pt cooperates with staff requests, attends groups, and denies SI/SIB. Pt showered this shift and shows full range affect. Pt states she is hopeful court will go well this week and she can prove she wants to stay sober by showing the court a social media post she made about finding sober activities before she came in. Pt states she is upset because her mom seems mad at her about rescinding the JABIER, but is hopeful she can talk to her mom today and work it out.      04/11/18 1400   Behavioral Health   Hallucinations denies / not responding to hallucinations   Thinking intact   Orientation person: oriented;place: oriented;time: oriented;date: oriented   Memory baseline memory   Insight insight appropriate to events;insight appropriate to situation   Judgement intact   Eye Contact at examiner   Affect full range affect   Mood mood is calm   Physical Appearance/Attire attire appropriate to age and situation   Hygiene well groomed   Suicidality other (see comments)  (denies)   1. Wish to be Dead No   2. Non-Specific Active Suicidal Thoughts  No   Self Injury other (see comment)  (no concerns)   Elopement (no concerns)   Activity other (see comment)  (visible and social in milieu)   Speech coherent;clear   Medication Sensitivity no observed side effects;no stated side effects   Psychomotor / Gait balanced;steady   Psycho Education   Type of Intervention 1:1 intervention   Response participates, initiates socially appropriate   Hours 0.5   Treatment Detail Check in   Activities of Daily Living   Hygiene/Grooming independent   Oral Hygiene independent   Dress independent   Room Organization independent   Activity   Activity Assistance Provided independent

## 2018-04-11 NOTE — PROGRESS NOTES
Pt's father called wanting to know what is going on with the petition and the outcome of the Rule 25 assessment. Writer provided father with information and explained that we petitioned, but the courts have the final say. As of right now it's still in process.

## 2018-04-11 NOTE — PROGRESS NOTES
Pt's mother called requesting  explained that we are unable to provide her with information due to their being no JABIER file any longer.

## 2018-04-11 NOTE — PROGRESS NOTES
Our  called from [facility] to get a update on Rule 25. Explained to her that PT completed the assessment and the  is recommending residential treatment.  states that unfortunate because this is a MI petition and we cannot include that in the petition.

## 2018-04-11 NOTE — PROGRESS NOTES
Attendance: Pt. Attended scheduled 3 of 3 OT sessions today.   OT: pt continues to participate fully in groups with positive affect and engaging with peers, pt reports feeling minorally agitated and wishes she could have more energy, though pt remained motivated to participate in all groups tasks for full duration of 1 hour group. Pt is pleasant in groups and is encouraging to peers to participate, pt advocates for self and obtains materials pt needs for projects as well as requests needs and personal boundaries.     04/11/18 1500   Occupational Therapy   Type of Intervention structured groups   Response Participates   Hours 3

## 2018-04-12 PROCEDURE — 25000132 ZZH RX MED GY IP 250 OP 250 PS 637: Performed by: PSYCHIATRY & NEUROLOGY

## 2018-04-12 PROCEDURE — 25000132 ZZH RX MED GY IP 250 OP 250 PS 637: Performed by: NURSE PRACTITIONER

## 2018-04-12 PROCEDURE — 90853 GROUP PSYCHOTHERAPY: CPT

## 2018-04-12 PROCEDURE — 99232 SBSQ HOSP IP/OBS MODERATE 35: CPT | Performed by: PSYCHIATRY & NEUROLOGY

## 2018-04-12 PROCEDURE — 97150 GROUP THERAPEUTIC PROCEDURES: CPT | Mod: GO

## 2018-04-12 PROCEDURE — 12400007 ZZH R&B MH INTERMEDIATE UMMC

## 2018-04-12 RX ADMIN — OLANZAPINE 5 MG: 5 TABLET, ORALLY DISINTEGRATING ORAL at 21:35

## 2018-04-12 RX ADMIN — NICOTINE POLACRILEX 2 MG: 2 GUM, CHEWING ORAL at 20:17

## 2018-04-12 RX ADMIN — HYDROXYZINE HYDROCHLORIDE 50 MG: 50 TABLET, FILM COATED ORAL at 23:41

## 2018-04-12 RX ADMIN — OLANZAPINE 5 MG: 5 TABLET, ORALLY DISINTEGRATING ORAL at 08:09

## 2018-04-12 RX ADMIN — NICOTINE POLACRILEX 2 MG: 2 GUM, CHEWING ORAL at 17:44

## 2018-04-12 RX ADMIN — OLANZAPINE 5 MG: 5 TABLET, FILM COATED ORAL at 23:41

## 2018-04-12 ASSESSMENT — ACTIVITIES OF DAILY LIVING (ADL)
GROOMING: INDEPENDENT
ORAL_HYGIENE: INDEPENDENT
ORAL_HYGIENE: INDEPENDENT
DRESS: STREET CLOTHES;SCRUBS (BEHAVIORAL HEALTH);INDEPENDENT
GROOMING: INDEPENDENT
DRESS: INDEPENDENT

## 2018-04-12 NOTE — PLAN OF CARE
Problem: Mood Impairment (Depressive Signs/Symptoms) (Adult)  Goal: Improved Mood Symptoms (Depressive Signs/Symptoms)  Patient, while hospitalized, will:  -attend unit programming to develop health coping skills  -be medication compliant   -verbalize an understanding of their medication regimen  -verbalize decrease in depressive signs/symptoms  -will participate in discharge planning  -will seek out staff at urges of self-harm    Patient, prior to dishcharge, will:  -verbalize 2 coping skills that promote mental health   -verbalize absence/decrease of SI/SIB   -develop a safety plan  -will identify a support system     TO PROMOTE SAFETY    Patient identified the following:  Triggers:  ----------  Wellness Strategies:  ----------  Warning Signs:  ----------  Feedback (people they would like to receive feedback from if early warning signs - ex. Friends, family, partner/spouse, support groups, coworkers):  ----------  Taking Action:  ----------  Ways to [place]:        Self-Reflection & Planning.  Assessed patient's progress completing forms related to Illness Management Recovery (including Personal Plan of Care, Adult Coping Plan, and My Support and Coping Plan) and assisted as needed.    Encouraged patient to continue to consider triggers, wellness strategies, early warning signs, feedback from others, actions to take to prevent relapse, and coping strategies as part of a plan to remain well after leaving the hospital.           48 hour: Pt's mood has improved quite greatly. Pt is pleasant and smiling this am. Pt denies SI or any angry issues this morning. Pt bright, pleasant. Pt is aware of her pending court date on Friday, 4/13/18.

## 2018-04-12 NOTE — PROGRESS NOTES
Spoke with Rich from [facility] he has scheduled a phone interview with pt for 3pm today. He also asked that I fax the H&P and notes to Demetra at [phone] Case#[id number].

## 2018-04-12 NOTE — PROGRESS NOTES
"   04/12/18 1200   General Information   Art Directive house-tree-person  (ASSESSMENT)   Task Orientation    Task orientation skills calm and focused;confident in abilities;works independently;takes time to complete tasks;able to concentrate;sustains involvement;confident in choices   Task orientation concerns unable to adapt to variety;other (see comments)   Social Interaction   Social interaction skills responds to limits ;active participation;responds to therapist;makes eye contact;asks for help   Social interaction concerns difficulty responding to limits   Product/Content   Product/Content image reflects current feelings;image reflects positive aspects;positive self-statement;has own expressive language;presence of a metaphor/theme   Developmental level   Approximate developmental level of art expression age appropriate expression;age appropriate motor skills   Pt says they are here because I \"was\" sick.  In the house lives a person, they are calm and meditating. She likes the window light. She doesn't like the boring shape of the house.    Tree- is old. She likes the bushes because they are big, dislikes the odd shaped tree...like the house's odd shape The tree needs a tree house.    Person is Sánchez how old, they are meditating. They feel good doing this,. She likes their positioning,dislikes that they aren't real. The person needs \" a name to a face.\"  Gender- ria symbol  Male or female- \"neither\".  Patient is smart, kind and can be opinionated. She enjoys giving others feedback. She accepts feedback in terms of respectful listening, but has strong will that she know what she wants and needs and what is best for her.  "
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
## 2018-04-12 NOTE — PROGRESS NOTES
Palma had a positive evening. She attended and participated in groups. Mood was calm throughout the evening. Denies SI/SIB. She says she experienced some auditory hallucinations earlier in the shift, but took Zyprexa, and that resolved the hallucinations.     Appetite: Good  Pain: N/A  SE's: N/A  Sleep: Good           04/11/18 2100   Behavioral Health   Hallucinations denies / not responding to hallucinations   Thinking intact   Orientation person: oriented;place: oriented;date: oriented   Memory baseline memory   Insight insight appropriate to situation;insight appropriate to events   Judgement intact   Eye Contact at examiner   Affect full range affect   Mood mood is calm   Physical Appearance/Attire attire appropriate to age and situation   Hygiene well groomed   Suicidality other (see comments)  (denies)   1. Wish to be Dead No   2. Non-Specific Active Suicidal Thoughts  No   Change in Protective Factors? No   Enviromental Risk Factors None   Self Injury other (see comment)  (denies)   Elopement (no current concerns)   Activity other (see comment)  (visible)   Speech clear;coherent   Medication Sensitivity no stated side effects;no observed side effects   Psychomotor / Gait balanced;steady   Activities of Daily Living   Hygiene/Grooming independent   Oral Hygiene independent   Dress independent   Room Organization independent

## 2018-04-13 PROCEDURE — 25000132 ZZH RX MED GY IP 250 OP 250 PS 637: Performed by: NURSE PRACTITIONER

## 2018-04-13 PROCEDURE — 90853 GROUP PSYCHOTHERAPY: CPT

## 2018-04-13 PROCEDURE — 25000132 ZZH RX MED GY IP 250 OP 250 PS 637: Performed by: PSYCHIATRY & NEUROLOGY

## 2018-04-13 PROCEDURE — 12400007 ZZH R&B MH INTERMEDIATE UMMC

## 2018-04-13 PROCEDURE — 97150 GROUP THERAPEUTIC PROCEDURES: CPT | Mod: GO

## 2018-04-13 RX ADMIN — HYDROXYZINE HYDROCHLORIDE 50 MG: 50 TABLET, FILM COATED ORAL at 14:26

## 2018-04-13 RX ADMIN — OLANZAPINE 5 MG: 5 TABLET, ORALLY DISINTEGRATING ORAL at 21:52

## 2018-04-13 RX ADMIN — OLANZAPINE 5 MG: 5 TABLET, ORALLY DISINTEGRATING ORAL at 08:55

## 2018-04-13 ASSESSMENT — ACTIVITIES OF DAILY LIVING (ADL)
GROOMING: HANDWASHING;INDEPENDENT
ORAL_HYGIENE: INDEPENDENT
DRESS: INDEPENDENT
LAUNDRY: WITH SUPERVISION
DRESS: INDEPENDENT
ORAL_HYGIENE: INDEPENDENT
GROOMING: INDEPENDENT

## 2018-04-13 NOTE — PROGRESS NOTES
"Attendance: Pt. Attended scheduled 3 of 3 OT sessions today.   OT: pt has had positive participation and positive affect for all groups until PM OT group, after recent phone call, pt went to room with low-mood affect, though when encouraged by therapist to participate, pt joined group and affect improved, pt verbalized \"I like this, this is fun.\" Pt remains focused and engaged in all groups and completes tasks.     04/13/18 1600   Occupational Therapy   Type of Intervention structured groups   Response Participates   Hours 3         "

## 2018-04-13 NOTE — PROGRESS NOTES
PT was present and active in the milieu  Pt attended groups and was an active participant    Pt was social and engaged with other peers. Pt was calm and had a full range affect. Pt stated two occasions feeling like people were staring. Pt said this was uncomfortable and asked this writer what she could do about it. Pt seemed to be receptive to advice and didn't bring it up again. Pt denied hallucinations and did not appear to be responding to any.     Pt denied SI SIB       04/13/18 1401   Behavioral Health   Hallucinations denies / not responding to hallucinations   Thinking intact   Orientation person: oriented;place: oriented;time: oriented   Memory baseline memory   Insight admits / accepts   Judgement intact   Eye Contact at examiner   Affect full range affect   Mood mood is calm   Physical Appearance/Attire attire appropriate to age and situation   Hygiene well groomed   Suicidality other (see comments)  (denies)   Self Injury other (see comment)  (denies)   Elopement (none stated or observed)   Activity other (see comment)  (active in the milieu)   Speech clear;coherent   Medication Sensitivity no stated side effects   Psychomotor / Gait balanced;steady   Activities of Daily Living   Hygiene/Grooming independent   Oral Hygiene independent   Dress independent   Room Organization independent

## 2018-04-13 NOTE — PROGRESS NOTES
PRN hydroxyzine 50 mg and Olazapine 5 mg administered per patient request to help with sleep and agitation. Will continue to monitor

## 2018-04-13 NOTE — PROGRESS NOTES
Writer received a call from the [facility] stating that the pt was on the phone with [facility] and the needed insurance information from me. It appears as though the pt contacted Santa on her own. When speaking with Santa I notified them that they should have received a Rule 25 assessment pertaining to this patient. They stated that they had not received anything. Writer followed up with Marisol from [facility]  to refax information to [facility].

## 2018-04-13 NOTE — PROGRESS NOTES
Writer spoke with Joana handy [facility] who is requesting that we fax the H&P and Psychiatry notes. They will get back to us later today after they have reviewed this documents to notify us if they have accepted the pt.

## 2018-04-13 NOTE — PROGRESS NOTES
"Pt had a good shift. Calm/cooperative with staff. Pt is friendly with staff/other pts. Pt denies mental health symptoms. Pt had a good visit with friends and family.  Pt has decent insight into mental health, and says she struggles with being \"forced to do things\". Active on milieu and attended groups. Pt says she open to CD treatment and spoke with someone about [facility] today. Pt would also like to look into [facility]. Pt would like to go to a place that allows cigarettes because she thinks it will help make her sobriety an easier transition. Pt says she is very ready to leave unit and continue onto the next step. Pt did report some auditory hallucinations of unexplained sounds. Pt says they are not distressing. Pt reports some anxiety about her plans for the future.     SI: denies  SIB: denies  HALLUCINATIONS: auditory  SLEEP: typical  APPETITE: typical  HYGIENE: good, did not shower this shift  NOTABLE: ready to leave, some auditory hallucinations       04/12/18 2000   Behavioral Health   Hallucinations auditory   Thinking intact   Orientation person: oriented;place: oriented;date: oriented;time: oriented   Memory baseline memory   Insight admits / accepts;insight appropriate to situation;insight appropriate to events;other (see comment)  (limited)   Judgement intact   Eye Contact at examiner   Affect full range affect   Mood mood is calm   Physical Appearance/Attire attire appropriate to age and situation   Hygiene well groomed   Suicidality other (see comments)  (denies)   1. Wish to be Dead No   2. Non-Specific Active Suicidal Thoughts  No   Self Injury other (see comment)  (denies)   Elopement (none stated or observed)   Activity other (see comment)  (active on milieu)   Speech clear;coherent   Medication Sensitivity no stated side effects;no observed side effects   Psychomotor / Gait balanced;steady   Activities of Daily Living   Hygiene/Grooming independent   Oral Hygiene independent   Dress " street clothes;scrubs (behavioral health);independent   Room Organization independent   Activity   Activity Assistance Provided independent

## 2018-04-13 NOTE — PROGRESS NOTES
"Pt had a good shift. Calm/cooperative with staff. Pt is friendly with staff/other pts. Pt denies mental health symptoms. Pt had a good visit with friends and family.  Pt has decent insight into mental health, and says she struggles with being \"forced to do things\". Active on milieu and attended groups. Pt says she open to CD treatment and spoke with someone about [facility] today. Pt says she is ready to leave unit and continue onto the next step.    SI: denies  SIB: denies  HALLUCINATIONS: denies  SLEEP: typical  APPETITE: typical  HYGIENE: good, did not shower this shift  NOTABLE: nervous for court, is hopeful she will be able to discharge after       04/12/18 2000   Behavioral Health   Hallucinations denies / not responding to hallucinations   Thinking intact   Orientation person: oriented;place: oriented;date: oriented;time: oriented   Memory baseline memory   Insight admits / accepts;insight appropriate to situation;insight appropriate to events;other (see comment)  (limited)   Judgement intact   Eye Contact at examiner   Affect full range affect   Mood mood is calm   Physical Appearance/Attire attire appropriate to age and situation   Hygiene well groomed   Suicidality other (see comments)  (denies)   1. Wish to be Dead No   2. Non-Specific Active Suicidal Thoughts  No   Self Injury other (see comment)  (denies)   Elopement (none stated or observed)   Activity other (see comment)  (active on milieu)   Speech clear;coherent   Medication Sensitivity no stated side effects;no observed side effects   Psychomotor / Gait balanced;steady   Activities of Daily Living   Hygiene/Grooming independent   Oral Hygiene independent   Dress street clothes;scrubs (behavioral health);independent   Room Organization independent   Activity   Activity Assistance Provided independent       "

## 2018-04-14 PROCEDURE — 25000132 ZZH RX MED GY IP 250 OP 250 PS 637: Performed by: PSYCHIATRY & NEUROLOGY

## 2018-04-14 PROCEDURE — 12400007 ZZH R&B MH INTERMEDIATE UMMC

## 2018-04-14 PROCEDURE — 97150 GROUP THERAPEUTIC PROCEDURES: CPT | Mod: GO

## 2018-04-14 PROCEDURE — 90853 GROUP PSYCHOTHERAPY: CPT

## 2018-04-14 RX ADMIN — OLANZAPINE 5 MG: 5 TABLET, ORALLY DISINTEGRATING ORAL at 22:22

## 2018-04-14 RX ADMIN — NICOTINE POLACRILEX 2 MG: 2 GUM, CHEWING ORAL at 17:28

## 2018-04-14 RX ADMIN — OLANZAPINE 5 MG: 5 TABLET, ORALLY DISINTEGRATING ORAL at 09:13

## 2018-04-14 RX ADMIN — NICOTINE POLACRILEX 2 MG: 2 GUM, CHEWING ORAL at 20:49

## 2018-04-14 RX ADMIN — NICOTINE POLACRILEX 2 MG: 2 GUM, CHEWING ORAL at 12:45

## 2018-04-14 ASSESSMENT — ACTIVITIES OF DAILY LIVING (ADL)
LAUNDRY: WITH SUPERVISION
DRESS: STREET CLOTHES;SCRUBS (BEHAVIORAL HEALTH);INDEPENDENT
ORAL_HYGIENE: INDEPENDENT
DRESS: INDEPENDENT
ORAL_HYGIENE: INDEPENDENT
GROOMING: INDEPENDENT
GROOMING: INDEPENDENT

## 2018-04-14 NOTE — PROGRESS NOTES
"Pt was active in the milieu and attended groups. She reports feeling \"content\" today. She denies anxiety and rates her depression at a 3/10. Pt also denies SI and SIB.     04/14/18 1049   Behavioral Health   Hallucinations denies / not responding to hallucinations   Thinking intact   Orientation person: oriented;place: oriented;date: oriented;time: oriented   Memory baseline memory   Insight admits / accepts   Judgement impaired   Eye Contact at examiner   Affect full range affect   Mood mood is calm   Physical Appearance/Attire attire appropriate to age and situation   Hygiene well groomed;other (see comment)  (showered )   Suicidality other (see comments)  (pt denies )   1. Wish to be Dead No   2. Non-Specific Active Suicidal Thoughts  No   Self Injury other (see comment)  (pt denies )   Elopement Statements about wanting to leave  (none stated, none observed )   Activity other (see comment)  (active in the milieu )   Speech clear;coherent   Medication Sensitivity no stated side effects;no observed side effects   Psychomotor / Gait balanced;steady   Activities of Daily Living   Hygiene/Grooming independent   Oral Hygiene independent   Dress independent   Laundry with supervision   Room Organization independent     "

## 2018-04-14 NOTE — PROGRESS NOTES
"Pt came up to staff desk and reported \"I want to go to [facility] regardless if [facility] accepts me, or accepts me first.\"  "

## 2018-04-14 NOTE — PLAN OF CARE
"Problem: Patient Care Overview  Goal: Plan of Care/Patient Progress Review  48 Hour Nursing Assessment    Patient evaluation continues. Assessed mood, anxiety, thoughts and behavior. Is progressing toward goals. Encourage participation in groups and developing healthy coping skills. Will continue to assess. Patient denies auditory or visual hallucinations. Refer to daily team meeting notes for individualized plan of care.    Pt has been somewhat withdrawn, but visible in the milieu. Pt denies SI, SIB and hallucinations. Pt reporting feeling more optimistic about treatment and is hoping to go to [facility]. Pt did have concerns when a new patient came that would be her roommate. Per staff report, pt stated, \"I like to be alone\" when roommate was oriented to the room. Pt then approached writer and asked to be roommates with a another female peer that she felt comfortable with. Pt stated that she was frustrated that her new roommate had dreadlocks, stating it was \"culturally inappropriate\" and she was afraid she would do or say something that would jeopardize her discharge. Pt was accepting when told this may not be an option this evening or at all. Pt did also agree when writer suggested that after getting to know this new roommate she might feel differently. However, staff were able to make a bed switch this evening. Pt has been overall calm and cooperative on the unit. She has been taking her medications and denies side effects. Pt denies any sleep or nutrition concerns.        "

## 2018-04-15 PROCEDURE — 25000132 ZZH RX MED GY IP 250 OP 250 PS 637: Performed by: PSYCHIATRY & NEUROLOGY

## 2018-04-15 PROCEDURE — 12400007 ZZH R&B MH INTERMEDIATE UMMC

## 2018-04-15 PROCEDURE — 25000132 ZZH RX MED GY IP 250 OP 250 PS 637: Performed by: NURSE PRACTITIONER

## 2018-04-15 PROCEDURE — 97150 GROUP THERAPEUTIC PROCEDURES: CPT | Mod: GO

## 2018-04-15 PROCEDURE — 90853 GROUP PSYCHOTHERAPY: CPT

## 2018-04-15 RX ADMIN — OLANZAPINE 5 MG: 5 TABLET, ORALLY DISINTEGRATING ORAL at 08:37

## 2018-04-15 RX ADMIN — HYDROXYZINE HYDROCHLORIDE 50 MG: 50 TABLET, FILM COATED ORAL at 20:10

## 2018-04-15 RX ADMIN — OLANZAPINE 5 MG: 5 TABLET, ORALLY DISINTEGRATING ORAL at 20:10

## 2018-04-15 ASSESSMENT — ACTIVITIES OF DAILY LIVING (ADL)
ORAL_HYGIENE: INDEPENDENT
DRESS: STREET CLOTHES
GROOMING: INDEPENDENT
DRESS: SCRUBS (BEHAVIORAL HEALTH)
LAUNDRY: WITH SUPERVISION
ORAL_HYGIENE: INDEPENDENT
GROOMING: INDEPENDENT

## 2018-04-15 NOTE — PLAN OF CARE
Problem: Mood Impairment (Depressive Signs/Symptoms) (Adult)  Goal: Improved Mood Symptoms (Depressive Signs/Symptoms)  Patient, while hospitalized, will:  -attend unit programming to develop health coping skills  -be medication compliant   -verbalize an understanding of their medication regimen  -verbalize decrease in depressive signs/symptoms  -will participate in discharge planning  -will seek out staff at urges of self-harm    Patient, prior to dishcharge, will:  -verbalize 2 coping skills that promote mental health   -verbalize absence/decrease of SI/SIB   -develop a safety plan  -will identify a support system     TO PROMOTE SAFETY    Patient identified the following:  Triggers:  ----------  Wellness Strategies:  ----------  Warning Signs:  ----------  Feedback (people they would like to receive feedback from if early warning signs - ex. Friends, family, partner/spouse, support groups, coworkers):  ----------  Taking Action:  ----------  Ways to [place]:        Self-Reflection & Planning.  Assessed patient's progress completing forms related to Illness Management Recovery (including Personal Plan of Care, Adult Coping Plan, and My Support and Coping Plan) and assisted as needed.    Encouraged patient to continue to consider triggers, wellness strategies, early warning signs, feedback from others, actions to take to prevent relapse, and coping strategies as part of a plan to remain well after leaving the hospital.   Outcome: Improving  Patient's mood, anxiety, thoughts and behavior continue to be assessed. Pt is progressing toward goals.   Encourage participation in groups and developing healthy coping skills. She denies concerns regarding sleep, appetite, and medication side effects.    Patient endorses auditory hallucinations at times as well as feelings of paranoia at times. She denies visual hallucinations, SI/SIB/HI. She is attending group and participating.  Patients affect is full range and bright. Patient  "contiues to be medication compliant. Palma reports mild anxiety and denies depression.   \"I've changed my mind about treatment\" \"I'm almost excited to go\" \"[facility]\". Will continue to monitor.         "

## 2018-04-15 NOTE — PROGRESS NOTES
Pt had a good shift. Pt was active on milieu and attended groups. Pt is hoping to hear back from [facility] on Monday or Tuesday to see if she's accepted. Pt is not as interested in [facility], but is willing to go if [facility] does not accept her. Pt has good insight into mental health. Pt does report some paranoia but is good at reality checking with staff and is good at being aware of it. Pt also reports some auditory hallucinations of indiscernible sounds. Pt is good at talking with staff when upset or anxious. Pt also reported that pt [id number] has been making her uncomfortable and will tell staff when it happens. Pt asked for information on psychosis and seems to feel better when she can do her own research.    SI: denies  SIB: denies  HALLUCINATIONS: auditory  SLEEP: typical  APPETITE: typical  HYGIENE: typical  NOTABLE: some paranoia, auditory hallucinations, good insight       04/14/18 2200   Behavioral Health   Hallucinations auditory   Thinking distractable;intact   Orientation person: oriented;place: oriented;date: oriented;time: oriented   Memory baseline memory   Insight admits / accepts   Judgement intact   Eye Contact at examiner   Affect full range affect   Mood mood is calm   Physical Appearance/Attire attire appropriate to age and situation   Hygiene well groomed   Suicidality other (see comments)  (denies)   1. Wish to be Dead No   2. Non-Specific Active Suicidal Thoughts  No   Self Injury other (see comment)  (denies)   Elopement (none stated or observed)   Activity other (see comment)  (active on milieu)   Speech clear;coherent   Medication Sensitivity no stated side effects;no observed side effects   Psychomotor / Gait balanced;steady   Activities of Daily Living   Hygiene/Grooming independent   Oral Hygiene independent   Dress street clothes;scrubs (behavioral health);independent   Room Organization independent

## 2018-04-16 PROCEDURE — 12400007 ZZH R&B MH INTERMEDIATE UMMC

## 2018-04-16 PROCEDURE — 99207 ZZC CDG-MDM COMPONENT: MEETS MODERATE - UP CODED: CPT | Performed by: PSYCHIATRY & NEUROLOGY

## 2018-04-16 PROCEDURE — 25000132 ZZH RX MED GY IP 250 OP 250 PS 637: Performed by: NURSE PRACTITIONER

## 2018-04-16 PROCEDURE — H2032 ACTIVITY THERAPY, PER 15 MIN: HCPCS

## 2018-04-16 PROCEDURE — 25000132 ZZH RX MED GY IP 250 OP 250 PS 637: Performed by: PSYCHIATRY & NEUROLOGY

## 2018-04-16 PROCEDURE — 99232 SBSQ HOSP IP/OBS MODERATE 35: CPT | Performed by: PSYCHIATRY & NEUROLOGY

## 2018-04-16 RX ORDER — OLANZAPINE 5 MG/1
5 TABLET, ORALLY DISINTEGRATING ORAL DAILY
Status: DISCONTINUED | OUTPATIENT
Start: 2018-04-17 | End: 2018-04-18 | Stop reason: HOSPADM

## 2018-04-16 RX ORDER — OLANZAPINE 10 MG/1
10 TABLET, ORALLY DISINTEGRATING ORAL AT BEDTIME
Status: DISCONTINUED | OUTPATIENT
Start: 2018-04-16 | End: 2018-04-18 | Stop reason: HOSPADM

## 2018-04-16 RX ADMIN — OLANZAPINE 5 MG: 5 TABLET, ORALLY DISINTEGRATING ORAL at 08:40

## 2018-04-16 RX ADMIN — OLANZAPINE 5 MG: 5 TABLET, FILM COATED ORAL at 09:30

## 2018-04-16 RX ADMIN — NICOTINE POLACRILEX 2 MG: 2 GUM, CHEWING ORAL at 09:30

## 2018-04-16 RX ADMIN — OLANZAPINE 10 MG: 10 TABLET, ORALLY DISINTEGRATING ORAL at 20:32

## 2018-04-16 ASSESSMENT — ACTIVITIES OF DAILY LIVING (ADL)
DRESS: INDEPENDENT
ORAL_HYGIENE: INDEPENDENT
GROOMING: INDEPENDENT;SHOWER;HANDWASHING
HYGIENE/GROOMING: INDEPENDENT
DRESS: STREET CLOTHES
ORAL_HYGIENE: INDEPENDENT

## 2018-04-16 NOTE — PROGRESS NOTES
Pt was anxious this AM.  Talked through plan with staff, had some nicotine gum, played with fidget, etc. and was able to cope.  Pt talked to  about preference of attending [facility]. Pt reports feeling comfortable with working with  through these decisions to make a plan that is most appropriate for pt's recovery.  Read book in afternoon after attending art therapy this AM.  Showered.  Good appetite.  Denies SI/SIB/hallucinations.     04/16/18 1300   Behavioral Health   Hallucinations denies / not responding to hallucinations   Thinking intact   Orientation person: oriented;place: oriented;date: oriented;time: oriented   Memory baseline memory   Insight admits / accepts   Judgement intact   Eye Contact at examiner   Affect full range affect   Mood anxious;mood is calm   Physical Appearance/Attire attire appropriate to age and situation   Hygiene well groomed   Suicidality other (see comments)  (denies)   1. Wish to be Dead No   2. Non-Specific Active Suicidal Thoughts  No   Self Injury other (see comment)  (denies)   Elopement (none stated or observed)   Activity other (see comment)  (anxious in AM, asked for help from staff, attended groups)   Speech clear;coherent   Medication Sensitivity no stated side effects;no observed side effects   Activities of Daily Living   Hygiene/Grooming independent;shower;handwashing   Oral Hygiene independent   Dress street clothes   Room Organization independent

## 2018-04-16 NOTE — PROGRESS NOTES
"   04/15/18 2000   Behavioral Health   Hallucinations denies / not responding to hallucinations   Thinking intact   Orientation person: oriented;place: oriented;date: oriented;time: oriented   Memory baseline memory   Insight admits / accepts   Judgement intact   Eye Contact at examiner   Affect irritable   Mood anxious;irritable;other (see comments)  (pt reported anxiety and irritability in community meeting)   Physical Appearance/Attire attire appropriate to age and situation;neat   Hygiene well groomed   Suicidality other (see comments)  (pt denied SI)   1. Wish to be Dead No   2. Non-Specific Active Suicidal Thoughts  No   Self Injury other (see comment)  (pt denied SIB)   Elopement (no concerns)   Activity other (see comment)  (pt is visible in the milieu)   Speech clear;coherent   Medication Sensitivity no stated side effects;no observed side effects   Psychomotor / Gait balanced;steady   Activities of Daily Living   Hygiene/Grooming independent   Oral Hygiene independent   Dress scrubs (behavioral health)   Room Organization independent   Activity   Activity Assistance Provided independent     Pt reported earlier in the evening she had high anxiety, irritability and was triggered during community meeting. Pt reported racing thoughts and that she attempted to use fidgets and coping skills. Pt was able to take a break from group in order to \"not snap.\" Pt was respectful and utilized her skills. Pt mentioned there were several stressors impacting her day, including a visit from an ex boyfriend during the morning shift. Pt goal is to do yoga 15 minutes a day and pt accomplished goal today and the previous 3 days. Pt rated her current anxiety before bedtime at 0/10, depression 3/10, and hopefulness at 8/10. Pt is hopeful to [facility] to [facility]. Pt reported the medications have been helpful.  "

## 2018-04-16 NOTE — PROGRESS NOTES
Writer called Marisol with [facility] to verify that Rule 25 has been sent to [facility]. Pt states that [facility] has not received it.

## 2018-04-16 NOTE — PROGRESS NOTES
NUTRITION EDUCATION    REASON FOR ASSESSMENT:  Patient verbal request for nutrition education     NUTRITION HISTORY:  Information obtained from patient    Patient reports she has been trying to remove gluten and dairy from her diet d/t sensitivities. She reports also having the desire to eventually become vegetarian and then vegan for health reasons and animal safety reasons. She request information on how to eat healthy while following these dietary preferences.     CURRENT DIET:  Regular     NUTRITION DIAGNOSIS:  Food- and nutrition-related knowledge deficit R/t no previous knowledge on healthy eating for special diet preferences (dairy free, gluten free, vegan/vegetarian) AEB patient request for nutrition education.       INTERVENTIONS:    Nutrition Prescription:  Regular diet (per pt preference, as she hopes to leave soon and will make her own choices following diet preferences).     Implementation:    Nutrition Education (Content):  a) Provided handout: My Plate Planner   b) Discussed importance of including vegan protein sources and calcium sources as a part of a balanced diet. Encouraged beans, soy products, nuts, meat alternatives, etc to meet protein needs. Discussed that soymilk is a better choice than almond or rice milks due to having a similar protein content as cow's milk. Encouraged tofu and other calcium-fortified products as good sources of calcium. Encouraged following myplate planner to ensure balanced nutrition with dietary preferences (1/2 plate veggies, 1/4 protein, 1/4 starch, 1 serving dairy alternative, 1 serving fruit). Encouraged whole grain gluten free options such as brown rice.       Nutrition Education (Application):  a) Discussed current eating habits and recommended alternative food choices    Anticipate good compliance    Diet Education - refer to Education Flowsheet    Goals:    Patient verbalizes understanding of diet by asking appropriate questions, engaging in  conversations    All of the above goals met during the education session    Follow Up/Monitoring:    Recommend Out-Patient Nutrition Referral, if further diet instructions are needed.    Denisa Cain RD, LD  Unit Pager: [phone]
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
## 2018-04-17 PROBLEM — Z72.0 TOBACCO ABUSE: Status: ACTIVE | Noted: 2018-04-17

## 2018-04-17 PROCEDURE — 25000132 ZZH RX MED GY IP 250 OP 250 PS 637: Performed by: PSYCHIATRY & NEUROLOGY

## 2018-04-17 PROCEDURE — 12400007 ZZH R&B MH INTERMEDIATE UMMC

## 2018-04-17 PROCEDURE — H2032 ACTIVITY THERAPY, PER 15 MIN: HCPCS

## 2018-04-17 PROCEDURE — 99231 SBSQ HOSP IP/OBS SF/LOW 25: CPT | Performed by: PSYCHIATRY & NEUROLOGY

## 2018-04-17 RX ORDER — HYDROXYZINE HYDROCHLORIDE 50 MG/1
50 TABLET, FILM COATED ORAL EVERY 4 HOURS PRN
Qty: 30 TABLET | Refills: 1 | Status: SHIPPED | OUTPATIENT
Start: 2018-04-17 | End: 2022-08-10

## 2018-04-17 RX ORDER — OLANZAPINE 5 MG/1
5 TABLET ORAL DAILY
Qty: 30 TABLET | Refills: 1 | Status: SHIPPED | OUTPATIENT
Start: 2018-04-17 | End: 2022-09-19

## 2018-04-17 RX ORDER — OLANZAPINE 10 MG/1
10 TABLET ORAL AT BEDTIME
Qty: 30 TABLET | Refills: 1 | Status: SHIPPED | OUTPATIENT
Start: 2018-04-17 | End: 2022-09-19

## 2018-04-17 RX ADMIN — OLANZAPINE 5 MG: 5 TABLET, ORALLY DISINTEGRATING ORAL at 09:08

## 2018-04-17 RX ADMIN — OLANZAPINE 10 MG: 10 TABLET, ORALLY DISINTEGRATING ORAL at 20:11

## 2018-04-17 ASSESSMENT — ACTIVITIES OF DAILY LIVING (ADL)
ORAL_HYGIENE: INDEPENDENT
DRESS: STREET CLOTHES
DRESS: STREET CLOTHES;SCRUBS (BEHAVIORAL HEALTH);INDEPENDENT
GROOMING: INDEPENDENT
LAUNDRY: WITH SUPERVISION
ORAL_HYGIENE: INDEPENDENT
GROOMING: INDEPENDENT

## 2018-04-17 NOTE — PROGRESS NOTES
Art Therapy   Type of Intervention structured groups   Response participates with encouragement   Hours 3   Pt was cooperative  in  groups today. She particularly liked working on her collage. She and writer have a very good rapport, however she was speaking for another pt today and she likes to co-direct groups, and writer enjoys this.  She needs some redirection when things don't go as she wants them too she gets irritable and impatient. She does have self awareness on this and writer is subtley working with her on this in group settings. When she tried to speak for another pt, writer redirected that the other pt would have to speak to writer herself about requests.

## 2018-04-17 NOTE — PROGRESS NOTES
Pt was visible and social in milieu for shift. Pt was visited by a friend, visit went well. Pt was upset that her partner didn't come during visiting hours even though she said she was going to come. Pt was irritable because of this. After pt calmed down, she had a good rest of the night. Pt hopes to be discharging soon. Denies SI, SIB, AH and VH       04/16/18 2200   Behavioral Health   Hallucinations denies / not responding to hallucinations   Thinking intact   Orientation person: oriented;place: oriented   Memory baseline memory   Insight insight appropriate to situation   Judgement intact   Eye Contact at examiner   Affect full range affect   Mood mood is calm;anxious;irritable   Physical Appearance/Attire attire appropriate to age and situation;appears stated age   Hygiene well groomed   Suicidality other (see comments)  (denies)   1. Wish to be Dead No   2. Non-Specific Active Suicidal Thoughts  No   Self Injury other (see comment)  (denies)   Elopement (no concerns)   Activity other (see comment)  (visible and social in milieu)   Speech coherent;clear   Medication Sensitivity no observed side effects;no stated side effects   Psychomotor / Gait balanced;steady   Psycho Education   Type of Intervention 1:1 intervention   Response participates, initiates socially appropriate   Hours 0.5   Activities of Daily Living   Hygiene/Grooming independent   Oral Hygiene independent   Dress independent   Room Organization independent

## 2018-04-17 NOTE — PLAN OF CARE
Problem: Mood Impairment (Depressive Signs/Symptoms) (Adult)  Goal: Improved Mood Symptoms (Depressive Signs/Symptoms)  Patient, while hospitalized, will:  -attend unit programming to develop health coping skills  -be medication compliant   -verbalize an understanding of their medication regimen  -verbalize decrease in depressive signs/symptoms  -will participate in discharge planning  -will seek out staff at urges of self-harm    Patient, prior to dishcharge, will:  -verbalize 2 coping skills that promote mental health   -verbalize absence/decrease of SI/SIB   -develop a safety plan  -will identify a support system     TO PROMOTE SAFETY    Patient identified the following:  Triggers:  ----------  Wellness Strategies:  ----------  Warning Signs:  ----------  Feedback (people they would like to receive feedback from if early warning signs - ex. Friends, family, partner/spouse, support groups, coworkers):  ----------  Taking Action:  ----------  Ways to [place]:        Self-Reflection & Planning.  Assessed patient's progress completing forms related to Illness Management Recovery (including Personal Plan of Care, Adult Coping Plan, and My Support and Coping Plan) and assisted as needed.    Encouraged patient to continue to consider triggers, wellness strategies, early warning signs, feedback from others, actions to take to prevent relapse, and coping strategies as part of a plan to remain well after leaving the hospital.   Outcome: Therapy, progress toward functional goals as expected  Patient's mood, anxiety, thoughts and behavior continue to be assessed. Pt is progressing toward goals.   Encourage participation in groups and developing healthy coping skills. She denies concerns regarding sleep, appetite, and medication side effects.    Patient denies auditory or visual hallucinations. Currently denies SI/SIB/HI. She is attending group and participating.  Patients affect is full range. She presents neat and clean.  "Patient contiues to be medication compliant and is able to express concerns.   \"I'm ready to get out of here\". Patient denies hallucinations but did ask writer if staff monitors/records patients conversations when in their rooms.   Plan is for Palma to discharge to [facility] tomorrow. Will continue to monitor.       "

## 2018-04-17 NOTE — PROGRESS NOTES
04/17/18 1800   Art Therapy   Type of Intervention structured groups   Response participates with cues/redirection   Hours 3   Pt is engaged in all groups. She enjoyed the DBT house exercise and she enjoyed afternoon yoga. She continues to have a tendency to get annoyed with writer if she cant get what she needs right away, such as an art material or a song played. She was challenging writer about music today. Writer explained why some perfectly fine music could not be played in a therapueutic group. It takes her a few kind redirections before she will accept the rules. If writer talks to another pt, for example about perfectionism. She likes to give her opinion on the conversation.  She is excited to leave for treatment, she seems to have accepted residential treatment which is much progress from last week.  She was very engaged in art and writing and these will be great coping skills for her as she moves on from [facility].

## 2018-04-17 NOTE — PROGRESS NOTES
Received a call from [facility] stating that the pt has been approved and if everything is verified with insurance then she can leave tomorrow.

## 2018-04-17 NOTE — DISCHARGE INSTRUCTIONS
..Behavioral Discharge Planning and Instructions      Summary: You were admitted on 3/31/2018 to [address] due to Suicidal Ideation.  You were treated by  Erma Romero MD. and discharged on 04/18/2018 [facility]     Principal Diagnosis:   Psychosis, unspecified (primary psychotic illness vs schizoaffective disorder vs substance induced)  Substance induced mood disorder  Cocaine Use Disorder, moderate to severe  Cannabis Use Disorder, severe    Community Health Follow-up Appointments:   Inpatient Treatment  Date: 04/18/2018  Time:       Provider: Iraj Levin  Address: [address] [place] [address]  Phone:[phone]   [facility] has faxed the Discharge Summary and AVS to this provider at Fax: [phone]        Attend all scheduled appointments with your outpatient providers. Call at least 24 hours in advance if you need to reschedule an appointment to ensure continued access to your outpatient providers.   Major Treatments, Procedures and Findings: You were provided with: a psychiatric assessment, assessed for medical stability, medication evaluation and/or management, group therapy, art therapy, milieu management, medical interventions and skills/OT groups.    Symptoms to Report: If you experience any of the following symptoms please report them right away to your provider or to family/friends; feeling more aggressive, increased confusion, losing more sleep, mood getting worse or thoughts of suicide.    Early warning signs can include: Early warning signs that could signal a potential relapse could include but not limited to the following; increased depression or anxiety sleep disturbances increased thoughts or behaviors of suicide or self-harm increased unusual thinking, such as paranoia or hearing voices.     Safety and Wellness:  Take all medicines as directed.  Make no changes unless your doctor suggests them. Follow treatment  "recommendations.  Refrain from alcohol and non-prescribed drugs. If there is a concern for safety, call 471.    Resources: Mental health crisis response for your [facility] is offered 24 hours a day, 7 days a week. A trained counselor will assess your current situation, offer support and counseling and connect you with local resources. Please call  [facility] Crisis Response [phone]    Crisis Intervention: 565.430.2833 or [phone] (TTY: 848.689.4974).  Call anytime for help.  [employer] [place] on Mental Illness (www.[place].org): 393.397.7481 or 752-090-1218.  Mental Health Consumer/Survivor Network of [place] (www.mhcsn.net): 475.583.2050 or 043-872-0948  Mental Health Association of [place] (www.mentalhealth.org): 162.749.2879 or 213-652-5597  Self- Management and Recovery Training., [employer]-- Toll free: 307.121.3946  [employer]  Text 4 Life: txt \"LIFE\" to 53667 for immediate support and crisis intervention  Crisis text line: Text \"[place]\" to 683986. Free, confidential, 24/7.  Crisis Intervention: 411.774.5503 or 816-804-6320. Call anytime for help.     The treatment team has appreciated the opportunity to work with you. Palma, please take care and make your recovery a daily recovery. If you have any questions or concerns our unit number is [phone].  You will be receiving a follow-up phone call within the next three days from a representative from behavioral health.  You have identified the best phone number to reach you as [phone] (home)       "

## 2018-04-18 VITALS
TEMPERATURE: 96.7 F | RESPIRATION RATE: 16 BRPM | WEIGHT: 147.2 LBS | OXYGEN SATURATION: 99 % | SYSTOLIC BLOOD PRESSURE: 123 MMHG | HEART RATE: 78 BPM | DIASTOLIC BLOOD PRESSURE: 65 MMHG

## 2018-04-18 PROCEDURE — 25000132 ZZH RX MED GY IP 250 OP 250 PS 637: Performed by: PSYCHIATRY & NEUROLOGY

## 2018-04-18 PROCEDURE — H2032 ACTIVITY THERAPY, PER 15 MIN: HCPCS

## 2018-04-18 PROCEDURE — 99238 HOSP IP/OBS DSCHRG MGMT 30/<: CPT | Performed by: PSYCHIATRY & NEUROLOGY

## 2018-04-18 RX ADMIN — OLANZAPINE 5 MG: 5 TABLET, ORALLY DISINTEGRATING ORAL at 08:07

## 2018-04-18 NOTE — PROGRESS NOTES
DISCHARGE: Pt expressed [facility] at [facility]. This RN and pt have reviewed all meds and aftercare plan and pt is in agreement with the plan. Pt denies any SI and states she is ready to take the next leap. All belongings returned. Pt bright and smiling upon DC.
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
## 2018-04-18 NOTE — PROGRESS NOTES
Pt had a good shift. Active on milieu and attended groups. Pt is excited about discharge to [facility] tomorrow and feels ready to move onto the next stage of treatment. Pt feels motivated to continue medication as well as staying sober. Pt thinks new medication dosage is helpful and reports no hallucinations today. Pt did notice she was much more tired than typical but believes this will likely pass.    SI: denies  SIB: denies  HALLUCINATIONS: denies  SLEEP: typical  APPETITE: typical  HYGIENE: good  NOTABLE: feels ready to discharge       04/17/18 2300   Behavioral Health   Hallucinations denies / not responding to hallucinations   Thinking intact   Orientation person: oriented;place: oriented;date: oriented;time: oriented   Memory baseline memory   Insight admits / accepts   Judgement intact   Eye Contact at examiner   Affect full range affect   Mood mood is calm   Physical Appearance/Attire attire appropriate to age and situation   Hygiene well groomed   Suicidality other (see comments)  (denies)   1. Wish to be Dead No   2. Non-Specific Active Suicidal Thoughts  No   Self Injury other (see comment)  (denies)   Elopement (none stated or observed)   Activity other (see comment)  (active on milieu)   Speech clear;coherent   Medication Sensitivity no observed side effects;other (see comment)  (more tired than typical)   Psychomotor / Gait balanced;steady   Activities of Daily Living   Hygiene/Grooming independent   Oral Hygiene independent   Dress street clothes;scrubs (behavioral health);independent   Room Organization independent   Activity   Activity Assistance Provided independent
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
## 2021-03-01 NOTE — PROGRESS NOTES
"Pt states she is not wanting to take any medications here because she can't trust them and \"they mess with my head\". Pt states the only thing that helps her is cigarettes and \"smoking other herbs\". Pt responded well to aromatherapy options on the unit, stating that she uses them at home as well. Pt states she doesn't remember when she last took medications because they \"mess with her head\" so she is unable to remember.   " PRE-SEDATION ASSESSMENT    CONSENT  Consent for procedure and sedation obtained: Yes    MEDICAL HISTORY  Significant medical/surgical history: No    PHYSICAL EXAM  History and Physical Reviewed: Patient has valid H&P within 30 days. I have reviewed and there are no changes.  Airway Anatomy : Class II  Heart : Normal  Lungs : Normal  LOC/Mental Status : Normal    OTHER FINDINGS       SEDATION RISK ASSESSMENT  Risk Status ASA: Class II - Normal patient with mild systemic disease  Plan for Sedation: Moderate Sedation  Indications for Procedure/Pre-Procedure Diagnosis and Planned Procedure: anxiety    NARRATIVE FINDINGS

## 2022-02-02 NOTE — PROGRESS NOTES
"48 hour nursing assessment:  Pt evaluation continues. Assessed mood, anxiety, thoughts, and behavior.Pt slept until 11am. Then met with MD. Pt. Not oriented to place, self, time or date. She said, \"I don't know what I am. Do you know? I'm not sure how I got here. I must have walked up some stairs, but I don't remember that.\" Pt picked up a menstrual pad in her room and said, \"I used to wear these. When I was a baby. Now my hair is to short and I don't use them anymore. But I did\" Pt found in bathroom drinking her mouthwash. Will remove items from her room. Info passed on to staff. I worked with patient to get her to take Zydis and she finally agreed. Her mom came onto unit and pt refused to sign an JABIER for staff to be able to discuss anything with her. Pt became anxious and increasingly paranoid during visit with her mom and ended up leaving to try and make a call to her dad. She said, \"I need to call my dad because he's going to come and pick me up to go home.\" She was told that dad would be visiting later and would not be picking her up at this time. She then laid down on the floor and cried a bit. Then she walked down to her room and went to sleep. Will continue to monitor.     Pt also heard responding to internal stimuli. She was paranoid about the way 3 different staff people looked at her and said, \"Why are you looking at me that way?\"   " SCRIBE #1 NOTE: I, Steven Del Rosario, am scribing for, and in the presence of, Felix Johnston Jr., MD. I have scribed the entire note.       History     Chief Complaint   Patient presents with    Rash     Pt has rash on R arm and R side of abdomen. Also c/o shaq leg pain     Review of patient's allergies indicates:   Allergen Reactions    Pletal [cilostazol]      Rash and leg pain         History of Present Illness     HPI    2/1/2022, 11:38 PM  History obtained from the patient      History of Present Illness: Abdullahi Urias is a 68 y.o. male patient with a PMHx of aortic stenosis, CAD, CHF, CKD, and CVA who presents to the Emergency Department for evaluation of right sided rash on arm and abdomen which onset gradually today. Pt states the rash started after he took medicine for his leg. Symptoms are constant and moderate in severity. No mitigating or exacerbating factors reported. Associated sxs include bilateral leg pain, numbness, paraesthesia that radiates to back of the legs and feet. Pt states his cardiologist, Dr. Mckeon, told him he had 50-70% blockage in his legs.  Patient denies any CP, SOB, fever, weakness, dizziness, chills, N/V/D, and all other sxs at this time. No further complaints or concerns at this time.       Arrival mode: Personal vehicle    PCP: Reji Valentin MD        Past Medical History:  Past Medical History:   Diagnosis Date    [facility]    Asthma     BPH (benign prostatic hyperplasia)     CAD (coronary artery disease)     Cardiomyopathy     CHF (congestive heart failure)     Chronic hoarseness     vocal cord surg    Chronic pain     CKD (chronic kidney disease) stage 3, GFR 30-59 ml/min     CVA (cerebral infarction)     8/2012 olol; reviewed ed note    Ex-smoker     GERD (gastroesophageal reflux disease)     Hepatitis C     treatedharvoni says cured, RNA NEG 6/2020    Hypertension     Pancreatitis     Prostate cancer     Prostate cancer      Prostate cancer     PVD (peripheral vascular disease)     Renal insufficiency     Substance abuse     cocaine, etoh , tob in past       Past Surgical History:  Past Surgical History:   Procedure Laterality Date    AORTIC VALVE REPLACEMENT  2014    Tissue valve replacement    BACK SURGERY      CARDIAC CATHETERIZATION      COLONOSCOPY      COLONOSCOPY N/A 2021    Procedure: COLONOSCOPY;  Surgeon: Faith Carrillo MD;  Location: [facility] ENDO;  Service: Endoscopy;  Laterality: N/A;    ESOPHAGOGASTRODUODENOSCOPY N/A 2021    Procedure: ESOPHAGOGASTRODUODENOSCOPY (EGD);  Surgeon: Faith Carrillo MD;  Location: [facility] ENDO;  Service: Endoscopy;  Laterality: N/A;    FOOT SURGERY      LEFT HEART CATHETERIZATION Left 2020    Procedure: CATHETERIZATION, HEART, LEFT;  Surgeon: Pasha Martin MD;  Location: [facility] CATH LAB;  Service: Cardiology;  Laterality: Left;    PENILE PROSTHESIS IMPLANT      PENILE PROSTHESIS REVISION  2018    [facility]    radiation for prostate      UPPER GASTROINTESTINAL ENDOSCOPY           Family History:  Family History   Problem Relation Age of Onset    Heart disease Mother     Heart attack Sister     Heart attack Brother     Colon cancer Neg Hx     Colon polyps Neg Hx     Liver cancer Neg Hx     Inflammatory bowel disease Neg Hx     Liver disease Neg Hx     Rectal cancer Neg Hx     Stomach cancer Neg Hx     Ulcerative colitis Neg Hx        Social History:  Social History     Tobacco Use    Smoking status: Former Smoker     Packs/day: 2.00     Years: 8.00     Pack years: 16.00     Quit date: 2012     Years since quittin.4    Smokeless tobacco: Never Used   Substance and Sexual Activity    Alcohol use: No     Comment: Sober since 2012    Drug use: No    Sexual activity: Yes        Review of Systems     Review of Systems   Constitutional: Negative for chills and fever.   HENT: Negative for  sore throat.    Respiratory: Negative for shortness of breath.    Cardiovascular: Negative for chest pain.   Gastrointestinal: Negative for diarrhea, nausea and vomiting.   Genitourinary: Negative for dysuria.   Musculoskeletal: Positive for myalgias (bilateral leg ). Negative for back pain.   Skin: Positive for rash (right sided rash on arm and abdomen ).   Neurological: Positive for numbness (bilateral leg). Negative for dizziness and weakness.        +bilateral leg paraesthesia    Hematological: Does not bruise/bleed easily.   All other systems reviewed and are negative.     Physical Exam     Initial Vitals   BP Pulse Resp Temp SpO2   02/01/22 2148 02/01/22 2148 02/01/22 2148 02/01/22 2149 02/01/22 2148   (!) 140/72 87 20 97.9 °F (36.6 °C) 97 %      MAP       --                 Physical Exam  Nursing Notes and Vital Signs Reviewed.  Constitutional: Patient is in no acute distress. Well-developed and well-nourished.  Head: Atraumatic. Normocephalic.  Eyes: PERRL. EOM intact. Conjunctivae are not pale. No scleral icterus.  ENT: Mucous membranes are moist. Oropharynx is clear and symmetric.    Neck: Supple. Full ROM. No lymphadenopathy.  Cardiovascular: Regular rate. Regular rhythm. No murmurs, rubs, or gallops. Distal pulses are 2+ and symmetric.  Pulmonary/Chest: No respiratory distress. Clear to auscultation bilaterally. No wheezing or rales.  Abdominal: Soft and non-distended.  There is no tenderness.  No rebound, guarding, or rigidity. Good bowel sounds.  Genitourinary: No CVA tenderness  Musculoskeletal: Moves all extremities. No obvious deformities. No edema. No calf tenderness.  Skin: Warm and dry.  Mild patchy rash to the right side of his chest and right abdomen.  This consistent with contact dermatitis.  No mucous membrane involvement.  Neurological:  Alert, awake, and appropriate.  Normal speech.  No acute focal neurological deficits are appreciated.  Psychiatric: Normal affect. Good eye contact.  "Appropriate in content.     ED Course   Procedures  ED Vital Signs:  Vitals:    02/01/22 2148 02/01/22 2149 02/01/22 2356   BP: (!) 140/72     Pulse: 87     Resp: 20  20   Temp:  97.9 °F (36.6 °C)    TempSrc: Oral Oral    SpO2: 97%     Weight: 112.5 kg (247 lb 14.5 oz)     Height: 5' 8" (1.727 m)         Abnormal Lab Results:  Labs Reviewed - No data to display     All Lab Results: none    Imaging Results:  Imaging Results    None               The Emergency Provider reviewed the vital signs and test results, which are outlined above.     ED Discussion     12:17 AM: Reassessed pt at this time. Discussed with pt all pertinent ED information and results. Discussed pt dx and plan of tx. Gave pt all f/u and return to the ED instructions. All questions and concerns were addressed at this time. Pt expresses understanding of information and instructions, and is comfortable with plan to discharge. Pt is stable for discharge.    I discussed with patient and/or family/caretaker that evaluation in the ED does not suggest any emergent or life threatening medical conditions requiring immediate intervention beyond what was provided in the ED, and I believe patient is safe for discharge.  Regardless, an unremarkable evaluation in the ED does not preclude the development or presence of a serious of life threatening condition. As such, patient was instructed to return immediately for any worsening or change in current symptoms.    Patient is stable nontoxic.  Patient has a history of claudications well as back issues.  He notes pain to his legs when he is up and walking around improves with rest.  This consistent with claudication.  I have referred back to his cardiologist for re-evaluation for possible stenting if needed.  Will treat with pain control interim.  I have dosed with steroids in the right Benadryl at home for his rash.  Patient verbalized understanding room with all instructions seems reliable.  Safe for discharge in my " opinion.               ED Medication(s):  Medications   HYDROmorphone injection 1 mg (1 mg Intramuscular Given 2/1/22 2356)   dexamethasone injection 8 mg (8 mg Intramuscular Given 2/1/22 2356)       New Prescriptions    OXYCODONE-ACETAMINOPHEN (PERCOCET)  MG PER TABLET    Take 1 tablet by mouth every 4 (four) hours as needed for Pain.        Follow-up Information     Reji Valentin MD.    Specialty: Family Medicine  Contact information:  [id number]  [place] [address]  [phone]             Jeffery Mckeon MD.    Specialties: Cardiology, Cardiovascular Disease  Contact information:  [id number]  [place] [address]  [phone]                             Scribe Attestation:   Scribe #1: I performed the above scribed service and the documentation accurately describes the services I performed. I attest to the accuracy of the note.     Attending:   Physician Attestation Statement for Scribe #1: I, Felix Johnston Jr., MD, personally performed the services described in this documentation, as scribed by Steven Del Rosario, in my presence, and it is both accurate and complete.           Clinical Impression       ICD-10-CM ICD-9-CM   1. Rash  R21 782.1   2. Claudication, intermittent  I73.9 443.9       Disposition:   Disposition: Discharged  Condition: Stable         Felix Johnston Jr., MD  02/02/22 0019

## 2022-07-12 ENCOUNTER — PRENATAL OFFICE VISIT (OUTPATIENT)
Dept: MIDWIFE SERVICES | Facility: CLINIC | Age: 27
End: 2022-07-12
Payer: COMMERCIAL

## 2022-07-12 ENCOUNTER — PRENATAL OFFICE VISIT (OUTPATIENT)
Dept: NURSING | Facility: CLINIC | Age: 27
End: 2022-07-12
Payer: COMMERCIAL

## 2022-07-12 VITALS
HEART RATE: 79 BPM | OXYGEN SATURATION: 100 % | HEIGHT: 66 IN | DIASTOLIC BLOOD PRESSURE: 73 MMHG | WEIGHT: 153.2 LBS | BODY MASS INDEX: 24.62 KG/M2 | SYSTOLIC BLOOD PRESSURE: 117 MMHG

## 2022-07-12 VITALS — BODY MASS INDEX: 23.76 KG/M2 | HEIGHT: 66 IN

## 2022-07-12 DIAGNOSIS — Z34.02 ENCOUNTER FOR SUPERVISION OF NORMAL FIRST PREGNANCY IN SECOND TRIMESTER: Primary | ICD-10-CM

## 2022-07-12 DIAGNOSIS — Z34.00 ENCOUNTER FOR SUPERVISION OF NORMAL FIRST PREGNANCY: Primary | ICD-10-CM

## 2022-07-12 PROBLEM — R19.8 GI SYMPTOMS: Status: ACTIVE | Noted: 2022-03-04

## 2022-07-12 PROBLEM — G25.81 RESTLESS LEG SYNDROME: Status: ACTIVE | Noted: 2022-06-06

## 2022-07-12 PROBLEM — F39 UNSPECIFIED MOOD (AFFECTIVE) DISORDER (H): Status: ACTIVE | Noted: 2018-04-01

## 2022-07-12 PROBLEM — F41.9 ANXIETY DISORDER, UNSPECIFIED TYPE: Status: ACTIVE | Noted: 2022-05-20

## 2022-07-12 PROBLEM — Z59.9 HOUSING OR ECONOMIC CIRCUMSTANCE: Status: ACTIVE | Noted: 2022-03-04

## 2022-07-12 PROBLEM — F17.200 TOBACCO USE DISORDER: Status: ACTIVE | Noted: 2018-04-17

## 2022-07-12 LAB
ABO/RH(D): NORMAL
ANTIBODY SCREEN: NEGATIVE
ERYTHROCYTE [DISTWIDTH] IN BLOOD BY AUTOMATED COUNT: 13.7 % (ref 10–15)
HCT VFR BLD AUTO: 32.2 % (ref 35–47)
HGB BLD-MCNC: 10.9 G/DL (ref 11.7–15.7)
MCH RBC QN AUTO: 30.3 PG (ref 26.5–33)
MCHC RBC AUTO-ENTMCNC: 33.9 G/DL (ref 31.5–36.5)
MCV RBC AUTO: 89 FL (ref 78–100)
PLATELET # BLD AUTO: 278 10E3/UL (ref 150–450)
RBC # BLD AUTO: 3.6 10E6/UL (ref 3.8–5.2)
SPECIMEN EXPIRATION DATE: NORMAL
WBC # BLD AUTO: 10.5 10E3/UL (ref 4–11)

## 2022-07-12 PROCEDURE — 36415 COLL VENOUS BLD VENIPUNCTURE: CPT | Performed by: ADVANCED PRACTICE MIDWIFE

## 2022-07-12 PROCEDURE — 86803 HEPATITIS C AB TEST: CPT | Performed by: ADVANCED PRACTICE MIDWIFE

## 2022-07-12 PROCEDURE — 86900 BLOOD TYPING SEROLOGIC ABO: CPT | Performed by: ADVANCED PRACTICE MIDWIFE

## 2022-07-12 PROCEDURE — 85027 COMPLETE CBC AUTOMATED: CPT | Performed by: ADVANCED PRACTICE MIDWIFE

## 2022-07-12 PROCEDURE — 87340 HEPATITIS B SURFACE AG IA: CPT | Performed by: ADVANCED PRACTICE MIDWIFE

## 2022-07-12 PROCEDURE — 87389 HIV-1 AG W/HIV-1&-2 AB AG IA: CPT | Performed by: ADVANCED PRACTICE MIDWIFE

## 2022-07-12 PROCEDURE — 86850 RBC ANTIBODY SCREEN: CPT | Performed by: ADVANCED PRACTICE MIDWIFE

## 2022-07-12 PROCEDURE — 99207 PR NO CHARGE NURSE ONLY: CPT

## 2022-07-12 PROCEDURE — 87086 URINE CULTURE/COLONY COUNT: CPT | Performed by: ADVANCED PRACTICE MIDWIFE

## 2022-07-12 PROCEDURE — 86762 RUBELLA ANTIBODY: CPT | Performed by: ADVANCED PRACTICE MIDWIFE

## 2022-07-12 PROCEDURE — 86780 TREPONEMA PALLIDUM: CPT | Performed by: ADVANCED PRACTICE MIDWIFE

## 2022-07-12 PROCEDURE — 86901 BLOOD TYPING SEROLOGIC RH(D): CPT | Performed by: ADVANCED PRACTICE MIDWIFE

## 2022-07-12 PROCEDURE — 99207 PR FIRST OB VISIT: CPT | Performed by: ADVANCED PRACTICE MIDWIFE

## 2022-07-12 RX ORDER — QUETIAPINE FUMARATE 50 MG/1
25-50 TABLET, FILM COATED ORAL
Status: ON HOLD | COMMUNITY
Start: 2022-05-25 | End: 2023-01-30

## 2022-07-12 RX ORDER — VITAMIN A ACETATE, BETA CAROTENE, ASCORBIC ACID, CHOLECALCIFEROL, .ALPHA.-TOCOPHEROL ACETATE, DL-, THIAMINE MONONITRATE, RIBOFLAVIN, NIACINAMIDE, PYRIDOXINE HYDROCHLORIDE, FOLIC ACID, CYANOCOBALAMIN, CALCIUM CARBONATE, FERROUS FUMARATE, ZINC OXIDE, CUPRIC OXIDE 3080; 12; 120; 400; 1; 1.84; 3; 20; 22; 920; 25; 200; 27; 10; 2 [IU]/1; UG/1; MG/1; [IU]/1; MG/1; MG/1; MG/1; MG/1; MG/1; [IU]/1; MG/1; MG/1; MG/1; MG/1; MG/1
1 TABLET, FILM COATED ORAL DAILY
COMMUNITY
Start: 2022-05-17 | End: 2022-11-11

## 2022-07-12 RX ORDER — QUETIAPINE FUMARATE 25 MG/1
12.5 TABLET, FILM COATED ORAL
COMMUNITY
Start: 2022-03-29

## 2022-07-12 RX ORDER — ACETAMINOPHEN 500 MG
500-1000 TABLET ORAL
COMMUNITY
Start: 2022-06-06 | End: 2022-08-10

## 2022-07-12 RX ORDER — PYRIDOXINE HCL (VITAMIN B6) 25 MG
12.5 TABLET ORAL
COMMUNITY
Start: 2022-06-06 | End: 2022-09-19
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
## 2022-07-12 NOTE — PROGRESS NOTES
"Palma Kim is a 26 year old female    Pt presents to clinic today for a NOB visit.  Patient's last menstrual period was 2022.. Estimated Date of Delivery: Dec 23, 2022 is calculated from lmp alone Sure LMP from her phone filiberto     She has not had bleeding since her LMP.   She has not had nausea. Weight loss has not occurred.   FOB is not involved,  Pt is supported by her mom and a best friend.       OTHER CONCERNS: 1. Pt unsure as of yet where she would like to deliver.  Asking questions about doulas and home birth, WB, reviewed    Pt's hx of HSV is negative    ============================================  PERSONAL/SOCIAL HISTORY  Lives alone.  Employment: Full time.  Her job involves moderate activity .  Exercises no regular exercise program  Pt denies abuse and feels safe in her home and relationships.     REVIEW OF SYSTEMS  C: NEGATIVE for fever, chills, change in weight  I: NEGATIVE for worrisome rashes, moles or lesions  E/M: NEGATIVE for ear, mouth and throat problems  R: NEGATIVE for significant cough or SOB  CV: NEGATIVE for chest pain, palpitations or peripheral edema  GI: NEGATIVE for nausea, abdominal pain, heartburn, or change in bowel habits  : NEGATIVE for frequency, dysuria, or hematuria  PSYCHIATRIC:  NEGATIVE for depression, anxiety or other mental health concerns    PHYSICAL EXAM:  /73   Pulse 79   Ht 1.676 m (5' 6\")   Wt 69.5 kg (153 lb 3.2 oz)   LMP 2022   SpO2 100%   BMI 24.73 kg/m    BMI- Body mass index is 24.73 kg/m ., RECOMMENDED WEIGHT GAIN: 15-25 lbs.  GENERAL:  Pleasant pregnant female, alert, cooperative and well groomed.  SKIN:  Warm and dry, without lesions or rashes  HEAD: Symmetrical features.  MOUTH:  Buccal mucosa pink, moist without lesions.  Teeth in good repair.    NECK:  Thyroid without enlargement and nodules.  Lymph nodes not palpable.   LUNGS:  Clear to auscultation.      HEART:  RRR without murmur.  ABDOMEN: Soft without masses , tenderness " or organomegaly.  No CVA tenderness.  Uterus palpable at size equal to dates.  No scars noted..  MUSCULOSKELETAL:  Full range of motion  EXTREMITIES:  No edema. No significant varicosities.     PELVIC EXAM:  deferred    ASSESSMENT:  Intrauterine pregnancy at 16w4d weeks gestation.     (Z34.02) Encounter for supervision of normal first pregnancy in second trimester  (primary encounter diagnosis)  Comment:   Plan:     (Z34.00) Encounter for supervision of normal first pregnancy  Comment:   Plan:      PLAN:  Oriented to CNM service.    Instructed on use of triage nurse line and contacting the on call CNM after hours for an urgent need such as fever, vagina bleeding, bladder or vaginal infection, rupture of membranes,  or term labor.      Discussed the indications, uses for and false positives for quad screen  and fetal survey ultrasound at 18-20 weeks gestation. Will inform us at the next visit if she wished to avail herself of these screens.    Instructed on best evidence for: weight gain for her weight and height for pregnancy; healthy diet; exercise and activity during pregnancy; and maintenance of a generally healthy lifestyle.     Discussed the harms, benefits, side effects and alternative therapies for current prescribed and OTC medications.    Ruby William CNM

## 2022-07-12 NOTE — PATIENT INSTRUCTIONS
Deamansi Waldrop     Congratulations on your pregnancy!! We are so pleased you have chosen us to partner with you for your prenatal care, thank you.    You are welcome to make your appointments with any one of us.  If you would like to try and meet us all, please see the list.  If it works out better for your schedule, or you prefer, you could make your appointments with just a few of us.  You can also see us at the [facility] on Wednesdays.    Here is a list of the Certified Nurse Midwives in our group;   - James Márquez,   - Riley Chaidez,   - Belén Stein,   - Maryam Rojas,   - Dot Szymanski,   - Angélica Lux,  - Ruby William.  We work as a team and share call at the hospital so it may be any one of us seven with you when you labor and birth.    If you have questions or concerns during your pregnancy please call us at [phone] and press option 1.  This routes you directly to our OB/Gyn clinic staff and nurses during business hours.  After hours your call will be routed to a central answering service and nurse line.    [employer] messages are great for communicating about non urgent matters.  If you are in labor, please call.  We would much rather connect with you then have you be at home and worried.    We wish you the very best, and we are excited to be part of your journey.    If you are considering a home birth I would recommend looking up Idalia Balderrama with [facility].        HEMA Zhu [facility]   Pregnancy Resources    Childbirth and Parenting Education:   [facility]: http://[facility]/   (014) 141-BABY  [place]: (education, yoga & wellness) www.Navmii.[employer]  Enlightened Mama: www.enlightenedmama.com   Childbirth collective: (Parent topic nights)  www.childbirthcollective.org/  Hypnobabies:  www.hypnobabiestwincities.com/  Hypnobirthing:  Http://hypnobirthing.com/    Information about doulas:  Childbirth collective:  "http://www.childbirthcollective.org/  Doulas of [place] (MARTINA):  www.martina.org  [place]  project: http://twincitiesdoulaproject.com/       Book Recommendations:      Cristy Emory's Birthing From Within--first few chapters include a new-age tone, you may prefer to skip it and keep going, because there is good stuff later.  This book recommendation covers emotional preparation, but does cover coping with pain, and use of both pharmacological and nonpharmacological methods.     Dr. Alvarez' The Pregnancy Book and The Birth Book--the pregnancy book goes month-by month     Womanly Art of Breastfeeding by La Leche League International   Bestfeeding by Marilee Stapleton--great pictures     Mothering Your Nursing Toddler, by Adelina Guerrero.   Addresses dealing with so many of the challenging behaviors of a nursing toddler.     How Weaning Happens, by La Leche League.  Discusses weaning at all ages, from medically necessary weaning of an infant, all the way up to age 5 (or older), with why/why not, and strategies.  Very empowering book both for deciding to wean and deciding not to.    Internet Resources:     American College of Nurse-Midwives (ACNM) http://www.midwife.org/; look at the informational handouts at http://www.midwife.org/Share-With-Women     www.mymidwife.org     Mother to Baby (Medication and Herbal guidance in pregnancy): http://www.mothertobaby.org  Toll-Free Hotline: 479.119.3647     LactMed (Medication use while breastfeeding): http://toxnet.nlm.nih.gov/newtoxnet/lactmed.htm     Women's Health.gov:  http://www.womenshealth.gov/a-z-topics/index.html     American pregnancy association - http://americanpregnancy.org      Early Childhood and Family Education (ECFE):  ECFE offers parents hands-on learning experiences that will nourish a lifetime of teachable moments.  http://ecfe.info/ecfe-home/     March of Dimes www.[employer] - Nutrition  www.mypyramid.gov  Under \"For Consumers,\" " "click on \"pregnant and breastfeeding women.\"      Centers for Disease Control and Prevention (CDC) - Vaccines : http://www.cdc.gov/vaccines/        When researching information on the web, question the validity of websites.  The authorSTREAM.com .gov, .edu and.org tend to be more reliable information.  If there are a lot of advertisements, be cautious of the information provided. Blogs and chat rooms can often have incorrect information.     End of Pregnancy Information    We are so pleased you have chosen us to help you with your birth.  Our goal is to help support a safe, meaningful and rewarding birth experience for you and your family.  The following information may be helpful as you near the end of your pregnancy.  If you would like to schedule a tour of the [facility] please call [phone].    LABOR INSTRUCTIONS  If you would like to try and avoid the use of pain medications for your labor, one of the best things you can do is to stay home as long as your and your baby s condition allow it, especially during the early part of your labor.  The Midwife on call can help guide you through this part if you are unsure, call us at [phone] and press option 1 any time, day or night.  Part of our assessment will be in speaking with you directly on the phone.  How do I know if I am in active labor?   Active labor will be regular, painful contractions that are lasting a minute or more and coming every 5 minutes for more than an hour in a row.  The intensity, or how much they hurt is more important that how frequently or often the contractions are coming.  You could try and take a hot shower or tub bath, if the contractions continue to increase in intensity or frequency, it can be a sign of active labor.  If you feel like you could lay down and doze between contractions, then do that, doze, let your contraction come and know that your body is working towards having your baby and then relax and rest " again.  You could try having a light snack, like fruit, cereal or toast and continue to drink fluids.    Reasons to call the On Call Midwife @ [phone]  If you have vaginal bleeding like a heavy period  If you think your water broke  If you think your baby has not been moving enough  If you think you are in labor  If you are worried or concerned about yourself or your baby      PAIN MEDICATIONS IN LABOR  Should I use pain medications in labor?  This decision to use pain medication or not during labor is for each woman to decide.  Some women have very strong feelings one way or the other, others would like to wait and see how it goes.  My personal recommendation would be to be flexible.  If someone is laboring and coping well with the process, they are making progress and do not want pain medication then they don t need it.  We have bathtubs, birthing balls, labor slings, rocking chairs and a labor position menu to support laboring without intervention and/or pain medication.  However, if someone has been awake with early labor for several nights, is having a hard time coping with the process, or wants pain medication, then pain medication can be a helpful tool to have.  What types of pain medications are available to me during my labor?    Nitrous Oxide  Nitrous Oxide is a quick acting gas that you breathe in through a mask.  It is used all over the world for labor pain relief.  Many dentists use it too.  We call it  nitrous  for short, other people may call it laughing gas.  We have a handout with more details about using Nitrous Oxide during labor.    IV narcotic, Fentanyl   Narcotic pain medications are medicines that go into your IV or as a shot into your muscle.  They help to  take the edge off the pain .  Thy do not, in general remove the pain.  They can make you feel kind of sleepy and help you relax better, especially in the earlier parts of labor.  Most of the time they last a short time (1-4  hours).  These medicines do  get to  your baby.  So your baby may get sleepy too.  This does not hurt your baby.  We do not give these medications if we think you will give birth to your baby soon.  This way your body will get rid of the medicine before your baby is born.    Epidural  Epidural is the most common pain medicine used by women in labor across the [place].   An epidural is a tiny, soft tube that is placed into your lower back that can give you pain medicine until after your baby is born.  Most often, a labor epidural provides the most complete labor pain relief that we have available.  A labor epidural is placed by a special doctor call an Anesthesiologist.  In order to have one placed you need to have an IV, and then sit on the side of the bed in kind of a slouched position curling around your baby.  After an epidural is placed you will need to stay in bed, we will help you change positions from side to side and sitting up to help your labor continue to progress.  We will also empty your bladder with a catheter about every three hours.    What Should I Bring to the Hospital?  A picture ID  Your insurance card  Your birth plan if you have one  Eyeglasses and/or contacts  Toiletries: Toothbrush and toothpaste, lip balm (it can be very dry at the hospital) lotion, deodorant, a brush or comb, hairband or barrettes  A bathrobe if you like to wear one  Warm sock or slippers  A camera and   Cell phone and cell phone   A comfy nursing bra  A soft blanket for baby  An infant car seat  A going home outfit for you, soft and comfortable, something you may have worn when you were about 5 months pregnant  A going home outfit and hat for your baby  Snacks for your support people    Some optional treats for you and your partner:  A pillow from home  Flameless candles  A music player  Treats to eat in postpartum like granola bars, cookies and fruit  A breastfeeding pillow - especially if you intend  to use one when you go home    * * * *   We are looking forward to working with you during this special time in your life.  If you have questions or have something we can do to help you, please don't hesitate to ask at one of your appointments, on [employer] or calling [phone] and press option 1.    HEMA Zhu CNM

## 2022-07-12 NOTE — PROGRESS NOTES
Important Information for Provider:     New ob nurse intake by phone, first pregnancy, late care. Patient has been seen by [facility] in [place]( review previous note from 6/24/2022) She was offered a ultrasound that day but would not stay.  Patient was scheduled with CNM today by . Scheduled ultrasound for 7/15/2022 at radiology    Prenatal OB Questionnaire  Patient supplied answers from flow sheet for:  Prenatal OB Questionnaire.  Past Medical History  Have you ever recieved care for your mental health? : (!) Yes  Have you ever been in a major accident or suffered serious trauma?: (!) Yes  Within the last year, has anyone hit, slapped, kicked or otherwise hurt you?: No  In the last year, has anyone forced you to have sex when you didn't want to?: No    Past Medical History 2   Have you ever received a blood transfusion?: No  Would you accept a blood transfusion if was medically recommended?: Yes  Does anyone in your home smoke?: No   Is your blood type Rh negative?: Unknown  Have you ever ?: No  Have you been hospitalized for a nonsurgical reason excluding normal delivery?: No  Have you ever had an abnormal pap smear?: No    Past Medical History (Continued)  Do you have a history of abnormalities of the uterus?: No  Did your mother take JACQUELYN or any other hormones when she was pregnant with you?: No  Do you have any other problems we have not asked about which you feel may be important to this pregnancy?: No                     Allergies as of 7/12/2022:    Allergies as of 07/12/2022 - Reviewed 04/17/2018   Allergen Reaction Noted     Doxycycline Nausea and Vomiting 02/08/2022     Metronidazole Nausea and Vomiting 02/08/2022     Penicillins Hives and Rash 06/14/2011       Current medications are:  Current Outpatient Medications   Medication Sig Dispense Refill     acetaminophen (TYLENOL) 500 MG tablet Take 500-1,000 mg by mouth       hydrOXYzine (ATARAX) 50 MG tablet Take 1 tablet (50  mg) by mouth every 4 hours as needed for anxiety 30 tablet 1     Prenatal Vit-Fe Fumarate-FA (PRENATAL PLUS) 27-1 MG TABS Take 1 tablet by mouth daily       QUEtiapine (SEROQUEL) 25 MG tablet Take 12.5 mg by mouth       QUEtiapine (SEROQUEL) 50 MG tablet Take 25-50 mg by mouth       nicotine polacrilex (NICORETTE) 2 MG gum Place 1 each (2 mg) inside cheek every hour as needed for smoking cessation (Patient not taking: Reported on 7/12/2022) 90 tablet 1     OLANZapine (ZYPREXA) 10 MG tablet Take 1 tablet (10 mg) by mouth At Bedtime (Patient not taking: Reported on 7/12/2022) 30 tablet 1     OLANZapine (ZYPREXA) 5 MG tablet Take 1 tablet (5 mg) by mouth daily (Patient not taking: Reported on 7/12/2022) 30 tablet 1     pyridOXINE (VITAMIN B6) 25 MG tablet Take 12.5 mg by mouth (Patient not taking: Reported on 7/12/2022)       Skin Protectants, Misc. (BASIS FACIAL MOISTURIZER) CREA Apply 1 Application topically (Patient not taking: Reported on 7/12/2022)           Early ultrasound screening tool:    Does patient have irregular periods?  No  Did patient use hormonal birth control in the three months prior to positive urine pregnancy test? No  Is the patient breastfeeding?  No  Is the patient 10 weeks or greater at time of education visit?  Yes

## 2022-07-13 LAB
HBV SURFACE AG SERPL QL IA: NONREACTIVE
HCV AB SERPL QL IA: NONREACTIVE
HIV 1+2 AB+HIV1 P24 AG SERPL QL IA: NONREACTIVE
RUBV IGG SERPL QL IA: 1.8 INDEX
RUBV IGG SERPL QL IA: POSITIVE
T PALLIDUM AB SER QL: NONREACTIVE

## 2022-07-14 LAB — BACTERIA UR CULT: NORMAL

## 2022-07-15 ENCOUNTER — HOSPITAL ENCOUNTER (OUTPATIENT)
Dept: ULTRASOUND IMAGING | Facility: CLINIC | Age: 27
Discharge: HOME | End: 2022-07-15
Attending: ADVANCED PRACTICE MIDWIFE | Admitting: ADVANCED PRACTICE MIDWIFE
Payer: COMMERCIAL

## 2022-07-15 DIAGNOSIS — Z34.00 ENCOUNTER FOR SUPERVISION OF NORMAL FIRST PREGNANCY: ICD-10-CM

## 2022-07-15 PROCEDURE — 76801 OB US < 14 WKS SINGLE FETUS: CPT | Mod: 26 | Performed by: RADIOLOGY

## 2022-07-15 PROCEDURE — 76801 OB US < 14 WKS SINGLE FETUS: CPT

## 2022-07-18 ENCOUNTER — TELEPHONE (OUTPATIENT)
Dept: BEHAVIORAL HEALTH | Facility: CLINIC | Age: 27
End: 2022-07-18

## 2022-07-29 ENCOUNTER — MYC MEDICAL ADVICE (OUTPATIENT)
Dept: MIDWIFE SERVICES | Facility: CLINIC | Age: 27
End: 2022-07-29

## 2022-07-29 NOTE — LETTER
August 1, 2022      Palma Kim  [address] [place] [address]        To Whom It May Concern:    Palma Kim was seen in our clinic for prenatal care. She has undergone routine prenatal testing and will continue to do so based on clinical recommendations. It is medically encouraged to drink at least 96oz of water daily during pregnancy. It is necessary for Palma to be allowed to use the bathroom without restrictions during the day. Discouraging bathroom use can cause significant complications including pelvic floor dysfunction and urinary tract infections, both of which will be detrimental to Romans pregnancy.     Please contact the clinic if you have further questions regarding this matter.       Sincerely,        HEMA Zhu CNM

## 2022-08-10 ENCOUNTER — PRENATAL OFFICE VISIT (OUTPATIENT)
Dept: MIDWIFE SERVICES | Facility: CLINIC | Age: 27
End: 2022-08-10
Payer: COMMERCIAL

## 2022-08-10 VITALS
OXYGEN SATURATION: 99 % | BODY MASS INDEX: 25.82 KG/M2 | DIASTOLIC BLOOD PRESSURE: 72 MMHG | WEIGHT: 160 LBS | SYSTOLIC BLOOD PRESSURE: 113 MMHG | HEART RATE: 90 BPM

## 2022-08-10 DIAGNOSIS — Z34.02 ENCOUNTER FOR SUPERVISION OF NORMAL FIRST PREGNANCY IN SECOND TRIMESTER: Primary | ICD-10-CM

## 2022-08-10 PROCEDURE — 87491 CHLMYD TRACH DNA AMP PROBE: CPT | Performed by: ADVANCED PRACTICE MIDWIFE

## 2022-08-10 PROCEDURE — 87591 N.GONORRHOEAE DNA AMP PROB: CPT | Performed by: ADVANCED PRACTICE MIDWIFE

## 2022-08-10 PROCEDURE — 99207 PR PRENATAL VISIT: CPT | Performed by: ADVANCED PRACTICE MIDWIFE

## 2022-08-10 NOTE — PROGRESS NOTES
17w2d   Feeling well overall, questions about lower abdominal aching and headaches, reviewed.  Here with REJI Ruffin.  U/S ordeed for next visit and GC/CT screen ordered today.  Taking PNVs with fe.  RTC 3-4 wks HEMA Zhu CNM

## 2022-08-11 LAB
C TRACH DNA SPEC QL NAA+PROBE: NEGATIVE
N GONORRHOEA DNA SPEC QL NAA+PROBE: NEGATIVE

## 2022-09-03 ENCOUNTER — HEALTH MAINTENANCE LETTER (OUTPATIENT)
Age: 27
End: 2022-09-03

## 2022-09-19 ENCOUNTER — TELEPHONE (OUTPATIENT)
Dept: MIDWIFE SERVICES | Facility: CLINIC | Age: 27
End: 2022-09-19

## 2022-09-19 ENCOUNTER — PRENATAL OFFICE VISIT (OUTPATIENT)
Dept: MIDWIFE SERVICES | Facility: CLINIC | Age: 27
End: 2022-09-19
Payer: COMMERCIAL

## 2022-09-19 VITALS
BODY MASS INDEX: 27.82 KG/M2 | HEIGHT: 66 IN | OXYGEN SATURATION: 99 % | WEIGHT: 173.1 LBS | DIASTOLIC BLOOD PRESSURE: 64 MMHG | SYSTOLIC BLOOD PRESSURE: 107 MMHG | HEART RATE: 81 BPM

## 2022-09-19 DIAGNOSIS — Z34.82 ENCOUNTER FOR SUPERVISION OF OTHER NORMAL PREGNANCY, SECOND TRIMESTER: Primary | ICD-10-CM

## 2022-09-19 PROCEDURE — 99207 PR PRENATAL VISIT: CPT | Performed by: ADVANCED PRACTICE MIDWIFE

## 2022-09-19 NOTE — PROGRESS NOTES
23w0d  Doing well over all, excited, nervous. Feels like she has good support at home. This is her first baby. 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 Has fetal survey ultrasound ordered today, was suppose to have it done before visit but scheduling got changed, will have it done this evening instead. Will need to follow up with patient on results. Patient does report headaches at times, drinking water and resting helps. Denies vaginal bleeding, fluid leakage, headaches that don't go away, and visual changes, discussed signs of pre-eclampsia. Discussed what glucose test, patient should come back in 2 weeks for both glucose test and prenatal visit. Does not have any additional questions at this time.    Combined Weight Gain:  20lbs  Fetal Heart Rate:  150 bpm  Fundal Height:  24 cm  Fetal Activity: Active  Next Appointment:  2 weeks    ALFRED Prasad Student    I was present with the FRANCES student who participated in the service and in the documentation of the services provided. I have verified the history and personally performed the physical exam and medical decision making, as documented by the student and edited by me.     Belén Stein, FRANCES, HEMA DANIELS CNM

## 2022-09-19 NOTE — TELEPHONE ENCOUNTER
Called patient to review ultrasound results. US WNL per preliminary report, dating agrees with LMP. Pt has next appointment scheduled in two weeks with CNM and will complete GCT at that time. Denies any other questions, has phone numbers to call if she needs anything before next appointment. Maryam Rojas CNM

## 2022-09-21 ENCOUNTER — MYC MEDICAL ADVICE (OUTPATIENT)
Dept: MIDWIFE SERVICES | Facility: CLINIC | Age: 27
End: 2022-09-21

## 2022-09-21 DIAGNOSIS — Z34.82 ENCOUNTER FOR SUPERVISION OF OTHER NORMAL PREGNANCY, SECOND TRIMESTER: Primary | ICD-10-CM

## 2022-10-07 ENCOUNTER — PRENATAL OFFICE VISIT (OUTPATIENT)
Dept: MIDWIFE SERVICES | Facility: CLINIC | Age: 27
End: 2022-10-07
Payer: COMMERCIAL

## 2022-10-07 VITALS — SYSTOLIC BLOOD PRESSURE: 112 MMHG | BODY MASS INDEX: 28.21 KG/M2 | DIASTOLIC BLOOD PRESSURE: 70 MMHG | WEIGHT: 174.8 LBS

## 2022-10-07 DIAGNOSIS — Z34.82 ENCOUNTER FOR SUPERVISION OF OTHER NORMAL PREGNANCY, SECOND TRIMESTER: Primary | ICD-10-CM

## 2022-10-07 DIAGNOSIS — Z33.1 PREGNANCY, INCIDENTAL: Primary | ICD-10-CM

## 2022-10-07 LAB
ERYTHROCYTE [DISTWIDTH] IN BLOOD BY AUTOMATED COUNT: 13.6 % (ref 10–15)
GLUCOSE 1H P 50 G GLC PO SERPL-MCNC: 94 MG/DL (ref 70–129)
HCT VFR BLD AUTO: 30.9 % (ref 35–47)
HGB BLD-MCNC: 10.5 G/DL (ref 11.7–15.7)
MCH RBC QN AUTO: 31.4 PG (ref 26.5–33)
MCHC RBC AUTO-ENTMCNC: 34 G/DL (ref 31.5–36.5)
MCV RBC AUTO: 93 FL (ref 78–100)
PLATELET # BLD AUTO: 260 10E3/UL (ref 150–450)
RBC # BLD AUTO: 3.34 10E6/UL (ref 3.8–5.2)
WBC # BLD AUTO: 13.4 10E3/UL (ref 4–11)

## 2022-10-07 PROCEDURE — 36415 COLL VENOUS BLD VENIPUNCTURE: CPT | Performed by: ADVANCED PRACTICE MIDWIFE

## 2022-10-07 PROCEDURE — 99207 PR PRENATAL VISIT: CPT | Performed by: ADVANCED PRACTICE MIDWIFE

## 2022-10-07 PROCEDURE — 85027 COMPLETE CBC AUTOMATED: CPT | Performed by: ADVANCED PRACTICE MIDWIFE

## 2022-10-07 PROCEDURE — 82950 GLUCOSE TEST: CPT | Performed by: ADVANCED PRACTICE MIDWIFE

## 2022-10-07 RX ORDER — MULTIVIT WITH MINERALS/LUTEIN
250 TABLET ORAL DAILY
Qty: 90 TABLET | Refills: 1 | Status: SHIPPED | OUTPATIENT
Start: 2022-10-07 | End: 2022-11-02

## 2022-10-07 RX ORDER — LANOLIN ALCOHOL/MO/W.PET/CERES
1000 CREAM (GRAM) TOPICAL DAILY
Qty: 90 TABLET | Refills: 1 | Status: SHIPPED | OUTPATIENT
Start: 2022-10-07 | End: 2022-11-02

## 2022-10-07 NOTE — PROGRESS NOTES
25w4d  Palma is feeling well, feels that her pregnancy is going good. Baby is active, denies bleeding, leaking of fluid, headaches, vision changes. Doing GCT/hgb check today. Passed GCT, hgb a bit low. Next visit in 4 weeks.  Riley Chaidez CNM

## 2022-10-14 ENCOUNTER — PATIENT OUTREACH (OUTPATIENT)
Dept: CARE COORDINATION | Facility: CLINIC | Age: 27
End: 2022-10-14

## 2022-10-14 NOTE — PROGRESS NOTES
Clinic Care Coordination Contact  [facility]/Voicemail      Chart Review: PCP referral from Riley Chaidez CNM, at the [facility]. Pt is 26 weeks pregnant.    Financial Support - medication affordability         Clinical Data: Care Coordinator Outreach  Outreach attempted x 1.  Left message on patient's voicemail with call back information and requested return call.  Plan:  Care Coordinator will try to reach patient again in 1-2 business days.    Megan Diaz  Community Health Worker  [facility]  [phone]

## 2022-10-14 NOTE — LETTER
[facility] CARE COORDINATION  [facility]  [address], [address]  [place] [address]  October 17, 2022    Palma Kim  [address] [place] [address]      Dear Palma,        I am a clinic community health worker who works with Riley Chaidez CNM, with the [facility]. I wanted to introduce myself and provide you with my contact information for you to be able to call me with any questions or concerns. Below is a description of clinic care coordination and how I can further assist you.       The clinic care coordination team is made up of a registered nurse, , financial resource worker and community health worker who understand the health care system. The goal of clinic care coordination is to help you manage your health and improve access to the health care system. Our team works alongside your provider to assist you in determining your health and social needs. We can help you obtain health care and community resources, providing you with necessary information and education. We can work with you through any barriers and develop a care plan that helps coordinate and strengthen the communication between you and your care team.    Please feel free to contact me with any questions or concerns regarding care coordination and what we can offer.      We are focused on providing you with the highest-quality healthcare experience possible.    Sincerely,     Megan Diaz  Community Health Worker  [facility]  [phone]

## 2022-10-17 NOTE — PROGRESS NOTES
Clinic Care Coordination Contact  [facility]/Voicemail      Chart Review: PCP referral from Riley Chaidez CNM, at the [facility]. Pt is 26 weeks pregnant.    Financial Support - medication affordability        Clinical Data: Care Coordinator Outreach  Outreach attempted x 2.  Left message on patient's voicemail with call back information and requested return call.  Plan: Care Coordinator will send care coordination introduction letter with care coordinator contact information and explanation of care coordination services via [employer]. Care Coordinator will do no further outreaches at this time.      Megan Diaz  Community Health Worker  [facility]  [phone]

## 2022-10-26 ENCOUNTER — PATIENT OUTREACH (OUTPATIENT)
Dept: CARE COORDINATION | Facility: CLINIC | Age: 27
End: 2022-10-26

## 2022-10-26 NOTE — PROGRESS NOTES
"Clinic Care Coordination Contact  Community Health Worker Initial Outreach      HX: CHW UTCx2 10/14/22 and 10/15/22 so completed care coordination order. Today, 10/26/22, pt left  at 1:19pm requesting a phone call. Pt has a new phone number: [phone]    Pt called me back this afternoon.     Chart Review: PCP referral from Riley Chaidez CNM, at the [facility] on 10/12/22. Pt is pregnant.    Financial Support - medication affordability      Spoke with pt:    Pt is not able to afford iron or B12 vitamin. Her pharmacy is a [facility] on [facility] and [address] [place] [address].    Pt would be interested in talking with MURRAY Zhou, about a new housing situation. Pt lives by herself in a one BR apartment. She doesn't feel this is the best living situation to bring a baby into - neighbors smoke in the building, pest problems, etc. Pt moved into this apartment on 1/2022.    Pt work at the [place] center for [employer]; agreeable to talk with Winnie during her lunch break from 2:30-3:00pm.         CHW Initial Information Gathering:  Referral Source: Specialist  Current living arrangement:: I live alone  Type of residence:: Apartment (1 bedroom)  Community Resources: [facility] Programs ([facility] PMAP, EBT, WIC)  Supplies Currently Used at Home: None  Equipment Currently Used at Home: none  Informal Support system::  (\"I have a decent support system.\")  No PCP office visit in Past Year: No  Transportation means:: Regular car  CHW Additional Questions  If ED/Hospital discharge, follow-up appointment scheduled as recommended?: N/A  Medication changes made following ED/Hospital discharge?: N/A  MyChart active?: Yes  Patient sent Social Determinants of Health questionnaire?: Yes    Patient accepts CC: Yes. Patient scheduled for assessment with MURRAY Pradhan, on 10/28/22 at 2:30pm. Patient noted desire to discuss medication affordability and a different housing option.     Megan Diaz  Community Health Worker  [facility], [place] [facility]  [phone]

## 2022-10-26 NOTE — PROGRESS NOTES
Clinic Care Coordination Contact  [facility]/Voicemail      HX: CHW UTx2 10/14/22 and 10/15/22 so completed care coordination order. Today, 10/26/22, pt left VM at 1:19pm requesting a phone call. Pt has a new phone number: [phone]       Chart Review: PCP referral from Riley Chaidez CNM, at the [facility] on 10/12/22. Pt is pregnant.    Financial Support - medication affordability       Clinical Data: Care Coordinator Outreach  Outreach attempted x 1.  Left message on patient's voicemail with call back information and requested return call.  Plan: Care Coordinator will try to reach patient again in 1-2 business days.      Megan Diaz  Community Health Worker  [facility], [place] [facility]  [phone]

## 2022-10-28 ENCOUNTER — PATIENT OUTREACH (OUTPATIENT)
Dept: NURSING | Facility: CLINIC | Age: 27
End: 2022-10-28
Payer: COMMERCIAL

## 2022-10-28 ENCOUNTER — PATIENT OUTREACH (OUTPATIENT)
Dept: CARE COORDINATION | Facility: CLINIC | Age: 27
End: 2022-10-28

## 2022-10-28 ASSESSMENT — ACTIVITIES OF DAILY LIVING (ADL): DEPENDENT_IADLS:: INDEPENDENT

## 2022-10-28 NOTE — PROGRESS NOTES
Clinic Care Coordination Contact    Clinic Care Coordination Contact  OUTREACH    Referral Information:  Referral Source: Specialist    Primary Diagnosis: Psychosocial    Chief Complaint   Patient presents with     Clinic Care Coordination - Initial        Universal Utilization:   Clinic Utilization  Difficulty keeping appointments:: No  Compliance Concerns: No  No-Show Concerns: No  No PCP office visit in Past Year: No  Utilization    Hospital Admissions  0             ED Visits  0             No Show Count (past year)  0                Current as of: 10/28/2022  3:16 PM              Clinical Concerns:  Current Medical Concerns: None presently except for pregnancy  Current Behavioral Concerns: None, has support of mental health providers  Education Provided to patient: [employer]  Pain  Pain (GOAL):: No  Health Maintenance Reviewed: Due/Overdue   Clinical Pathway: None    Medication Management:    Functional Status:  Dependent ADLs:: Independent  Dependent IADLs:: Independent  Bed or wheelchair confined:: No  Mobility Status: Independent  Fallen 2 or more times in the past year?: No  Any fall with injury in the past year?: No    Living Situation:  Current living arrangement:: I live alone  Type of residence:: Apartment    Lifestyle & Psychosocial Needs:    Social Determinants of Health     Tobacco Use: High Risk     Smoking Tobacco Use: Every Day     Smokeless Tobacco Use: Never     Passive Exposure: Not on file   Alcohol Use: Not At Risk     Frequency of Alcohol Consumption: Never     Average Number of Drinks: Patient does not drink     Frequency of Binge Drinking: Never   Financial Resource Strain: Medium Risk     Difficulty of Paying Living Expenses: Somewhat hard   Food Insecurity: No Food Insecurity     Worried About Running Out of Food in the Last Year: Never true     Ran Out of Food in the Last Year: Never true   Transportation Needs: Unmet Transportation Needs     Lack of Transportation (Medical):  No     Lack of Transportation (Non-Medical): Yes   Physical Activity: Insufficiently Active     Days of Exercise per Week: 1 day     Minutes of Exercise per Session: 30 min   Stress: No Stress Concern Present     Feeling of Stress : Not at all   Social Connections: Socially Isolated     Frequency of Communication with Friends and Family: More than three times a week     Frequency of Social Gatherings with Friends and Family: Twice a week     Attends Orthodoxy Services: Never     Active Member of Clubs or Organizations: No     Attends Club or Organization Meetings: Not on file     Marital Status: Never    Intimate Partner Violence: Not on file   Depression: Not at risk     PHQ-2 Score: 0   Housing Stability: High Risk     Unable to Pay for Housing in the Last Year: Yes     Number of Places Lived in the Last Year: 2     Unstable Housing in the Last Year: No     Diet:: Regular  Inadequate nutrition (GOAL):: No  Tube Feeding: No  Inadequate activity/exercise (GOAL):: No  Significant changes in sleep pattern (GOAL): No  Transportation means:: Regular car     Orthodoxy or spiritual beliefs that impact treatment:: No  Mental health DX:: Yes  Mental health DX how managed:: Medication, Outpatient Counseling, Psychiatrist  Mental health management concern (GOAL):: No  Chemical Dependency Status: No Current Concerns  Informal Support system:: Family, Friends, Parent, Partner, Other             Resources and Interventions:  Current Resources: Supplied information about GoodRx.com, [email or web address].[email or web address], and baby supplies.      Community Resources: Infant Car Seat program, Pregnancy Programs, WIC  Supplies Currently Used at Home: Nutritional Supplements  Equipment Currently Used at Home: none  Employment Status: employed full-time         Advance Care Plan/Directive  Advanced Care Plans/Directives on file:: No    Referrals Placed: Community Resources       Care Plan:  Patient/Caregiver understanding: yes      Plan: Pt will  reach out to CC SW if further needs arise.

## 2022-10-28 NOTE — LETTER
[facility] CARE COORDINATION    October 28, 2022    Palma Kim  [address] [place] [address]      Dear Palma,        I am a clinic care coordinator who works with HEMA Ponce CNP with the [facility]. I wanted to thank you for spending the time to talk with me.  Below is a description of clinic care coordination and how I can further assist you.       The clinic care coordination team is made up of a registered nurse, , financial resource worker and community health worker who understand the health care system. The goal of clinic care coordination is to help you manage your health and improve access to the health care system. Our team works alongside your provider to assist you in determining your health and social needs. We can help you obtain health care and community resources, providing you with necessary information and education. We can work with you through any barriers and develop a care plan that helps coordinate and strengthen the communication between you and your care team.    Please feel free to contact me with any questions or concerns regarding care coordination and what we can offer.      We are focused on providing you with the highest-quality healthcare experience possible.    GoodRx might be the best way for you to obtain the recommended vitamins from your provider.  Https://www.[employer].com/.  If you ask pharmacist to get your vitamins using GoodRx program, they could use this instead of MA to process the medications.      Poly[employer] Ventureslink.org provides resources for locating housing resources and information for renters.         I am including information for baby supplies that you can review.     Baby Supplies:  Tandem - http://www.wearetandem.org/mamas-jay.html  Individual Counseling and Support Groups. Childcare provided while utilizing Tandem services.  Care Packages - Every 3 months you can come meet with an advocate and  together you will assemble a care package for your family.  Thrift Store - Gently used clothing at very affordable prices. Clothing, diapers, toys, books, small equipment  12 - 3pm. Diaper Depot $3 - $5 per pack, 1 pack per month per child  Located: [address] [place] [address] - phone: [phone]  Hours: Monday, Wednesday, Thursday 9am to 3pm and Tuesday appointment only     [facility] - https://[facility].org/  A supportive, non-judgmental environment for facing an unexpected pregnancy or sexual health concerns. Services include free pregnancy testing, , resources for general assistance, financial assistance, education and earn while you learn courses. Services assist new moms from pregnancy to parents (mom s, dad s, grandparents) with children up to 2 years of age. Mary-based program  Free parental classes. Incentives for class completion: free car seats, free crib, free clothing, free pack and play, and so much more!  Located: [address], [place] [address] - phone: [phone] - appointments and walk-ins [facility] - https://[facility].org/  Hope program: support available while pregnant until baby turns one. Earn points to redeem for baby items. Mary-based program  Located: [address] [place] [address] - phone: [phone]    New Day; Pregnancy Care Center - https://www.newdaycenter.org/  Free and confidential: pregnancy mentoring,  recovery support and information, parenting classes, earn while you learn classes, help for families, community resources, and 24 hour helpline. Mary-based program  Located: [address] [place] [address] - phone: [phone]   Hours: Monday, Wednesday, Friday 10am - 2pm and Thursday 10am - 6pm    Everyday Miracles - https://www.everyday-miracles.org/requests/  Most of our services are available for free for those on straight  Medicaid or Blue plus, [facility], and [facility]. Car Seats, Breast pumps, and  services available. As well as Chiropractic and acupuncture services. Many different childbirth education, breastfeeding, and parenting classes are available.  [address], [place] [address] - phone: [phone]    [facility] - https://firstcarepregnancycenter.org/  We know that every child is unique, and so are you. From the moment you find out you are pregnant until your youngest child turns five, The Every Family Parenting Program provides tools to encourage and support you. Parenting Coaching and baby items available.  Located in [facility] and [place].    Debbie Fiore; Diaper Depot - https://www.[facility].org/community/neighborhood-ministries/supporting-neighborhood-youth-and-children/  N - 6 $3 per pack, Pull-ups $4 per pack. 2 packs per child per month until 4 years old.  [address] [place] [address] - phone [phone]  Tuesday 4 - 6pm; 1st and 3rd Saturday 11am - 1pm    Cradle of Hope - https://cradleofWomen [facility].org/    Cradle of Hope is a mary-based organization that encourages life by providing financial aid to women and babies in crisis, especially those women who might not choose life because of financial pressures.   Offer support with rent/mortgage, utility bills, medical bills, childcare, transportation, portable cribs, safe sleep information, safe sleep class, baby shower baskets, and many other resources.  Location: [address], [place] [address]   Hours: Monday - Friday 8am - 4pm     [facility] - https://nlfs.org/services/pregnancy-help/  We can offer our Parenting Plus educational program to help you learn about parenting and receive baby items such as clothing, diapers, wipes, and new equipment as well information about additional community resources to assist you and your child. Mary-based program  Location: [address]  [place] [address] - phone: [phone]    Diaper resources  https://www.diaperbankmn.org/diaper-bank-partner-agencies/  https://www.diaperbankmn.org/find-diapers/    Clothing  https://www.Pure Klimaschutz/html/[facility]_clothing_closets.html  https://www.Ignite[employer]/html/Pratt_clothing_closets.html  https://sites.Puzl.com/view/kaitlynskloset-[place]/home    General Baby supplies  https://babiesneedboxes.org/  https://birthright.org/services/?tab=3    WIC - http://www.health.[facility].[place]/divs/fh/wic/ppthome.html  A nutrition and breastfeeding program that helps young families eat well and be healthy.   WIC can help: Pregnant woman learn about nutritious foods for a healthy pregnancy and birth. Support breastfeeding and help new moms meet their breastfeeding goals. Families provide nutritious foods to their young children so they are healthy, happy and ready to learn.  Phone: [phone] to find WIC office nearest you    Helpful [facility] Website - https://[facility].org/what-we-do/community-resources.html      Sincerely,   America Stevenson, [facility]  Clinic Care Coordinator  [facility]   [phone]

## 2022-11-02 ENCOUNTER — PRENATAL OFFICE VISIT (OUTPATIENT)
Dept: MIDWIFE SERVICES | Facility: CLINIC | Age: 27
End: 2022-11-02
Payer: COMMERCIAL

## 2022-11-02 ENCOUNTER — MYC MEDICAL ADVICE (OUTPATIENT)
Dept: MIDWIFE SERVICES | Facility: CLINIC | Age: 27
End: 2022-11-02

## 2022-11-02 VITALS
DIASTOLIC BLOOD PRESSURE: 67 MMHG | BODY MASS INDEX: 30.04 KG/M2 | HEART RATE: 63 BPM | SYSTOLIC BLOOD PRESSURE: 108 MMHG | WEIGHT: 186.1 LBS | OXYGEN SATURATION: 100 %

## 2022-11-02 DIAGNOSIS — D50.9 IRON DEFICIENCY ANEMIA, UNSPECIFIED IRON DEFICIENCY ANEMIA TYPE: ICD-10-CM

## 2022-11-02 DIAGNOSIS — Z33.1 PREGNANCY, INCIDENTAL: ICD-10-CM

## 2022-11-02 DIAGNOSIS — Z34.03 ENCOUNTER FOR SUPERVISION OF NORMAL FIRST PREGNANCY IN THIRD TRIMESTER: Primary | ICD-10-CM

## 2022-11-02 PROCEDURE — 99207 PR PRENATAL VISIT: CPT | Performed by: ADVANCED PRACTICE MIDWIFE

## 2022-11-02 RX ORDER — MULTIVIT WITH MINERALS/LUTEIN
250 TABLET ORAL DAILY
Qty: 90 TABLET | Refills: 1 | Status: ON HOLD | OUTPATIENT
Start: 2022-11-02 | End: 2023-01-30

## 2022-11-02 RX ORDER — LANOLIN ALCOHOL/MO/W.PET/CERES
1000 CREAM (GRAM) TOPICAL DAILY
Qty: 90 TABLET | Refills: 1 | Status: ON HOLD | OUTPATIENT
Start: 2022-11-02 | End: 2023-01-30

## 2022-11-02 NOTE — PROGRESS NOTES
29w2d   Feeling well, has had trouble getting a hold of the fe and vit B12, unsure if her insurance will cover.  Hgb had been 10.5, reviewed high fe foods.  Initially declined TDAP today, after discussion pt states she will think about it.  Rh+, no rhogam needed.  May be considering transfer to a Birthing center, has a tour of [facility]  tomorrow.  RTC 3 wks HEMA Zhu CNM

## 2022-11-07 NOTE — TELEPHONE ENCOUNTER
See the written copy of this report in the patient's paper medical record.  These results did not interface directly into [facility] and are summarized here. mychart note.    Prescription resent.    Sherice Ulloa RN on 11/7/2022 at 5:43 AM

## 2022-11-23 ENCOUNTER — PRENATAL OFFICE VISIT (OUTPATIENT)
Dept: MIDWIFE SERVICES | Facility: CLINIC | Age: 27
End: 2022-11-23
Payer: COMMERCIAL

## 2022-11-23 VITALS
DIASTOLIC BLOOD PRESSURE: 72 MMHG | OXYGEN SATURATION: 98 % | BODY MASS INDEX: 30.7 KG/M2 | WEIGHT: 190.2 LBS | SYSTOLIC BLOOD PRESSURE: 121 MMHG | HEART RATE: 97 BPM

## 2022-11-23 DIAGNOSIS — Z34.03 ENCOUNTER FOR SUPERVISION OF NORMAL FIRST PREGNANCY IN THIRD TRIMESTER: Primary | ICD-10-CM

## 2022-11-23 PROCEDURE — 99207 PR PRENATAL VISIT: CPT | Performed by: ADVANCED PRACTICE MIDWIFE

## 2022-11-23 PROCEDURE — 59425 ANTEPARTUM CARE ONLY: CPT | Performed by: ADVANCED PRACTICE MIDWIFE

## 2023-01-29 ENCOUNTER — HOSPITAL ENCOUNTER (INPATIENT)
Facility: CLINIC | Age: 28
LOS: 2 days | Discharge: HOME HEALTH | End: 2023-01-31
Attending: ADVANCED PRACTICE MIDWIFE | Admitting: ADVANCED PRACTICE MIDWIFE
Payer: COMMERCIAL

## 2023-01-29 DIAGNOSIS — D50.9 IRON DEFICIENCY ANEMIA, UNSPECIFIED IRON DEFICIENCY ANEMIA TYPE: ICD-10-CM

## 2023-01-29 PROBLEM — Z34.90 ENCOUNTER FOR INDUCTION OF LABOR: Status: ACTIVE | Noted: 2023-01-29

## 2023-01-29 LAB
ABO/RH(D): NORMAL
ALT SERPL W P-5'-P-CCNC: 26 U/L (ref 0–50)
ANTIBODY SCREEN: NEGATIVE
AST SERPL W P-5'-P-CCNC: 29 U/L (ref 0–45)
CREAT SERPL-MCNC: 0.58 MG/DL (ref 0.52–1.04)
CREAT UR-MCNC: 47 MG/DL
ERYTHROCYTE [DISTWIDTH] IN BLOOD BY AUTOMATED COUNT: 14.4 % (ref 10–15)
GFR SERPL CREATININE-BSD FRML MDRD: >90 ML/MIN/1.73M2
HCT VFR BLD AUTO: 37.9 % (ref 35–47)
HGB BLD-MCNC: 13 G/DL (ref 11.7–15.7)
HOLD SPECIMEN: NORMAL
MCH RBC QN AUTO: 32.4 PG (ref 26.5–33)
MCHC RBC AUTO-ENTMCNC: 34.3 G/DL (ref 31.5–36.5)
MCV RBC AUTO: 95 FL (ref 78–100)
PLATELET # BLD AUTO: 215 10E3/UL (ref 150–450)
PROT UR-MCNC: 0.18 G/L
PROT/CREAT 24H UR: 0.38 G/G CR (ref 0–0.2)
RBC # BLD AUTO: 4.01 10E6/UL (ref 3.8–5.2)
SARS-COV-2 RNA RESP QL NAA+PROBE: NEGATIVE
SPECIMEN EXPIRATION DATE: NORMAL
WBC # BLD AUTO: 20.7 10E3/UL (ref 4–11)

## 2023-01-29 PROCEDURE — 84460 ALANINE AMINO (ALT) (SGPT): CPT | Performed by: ADVANCED PRACTICE MIDWIFE

## 2023-01-29 PROCEDURE — 250N000011 HC RX IP 250 OP 636: Performed by: ADVANCED PRACTICE MIDWIFE

## 2023-01-29 PROCEDURE — 120N000002 HC R&B MED SURG/OB UMMC

## 2023-01-29 PROCEDURE — 84156 ASSAY OF PROTEIN URINE: CPT | Performed by: ADVANCED PRACTICE MIDWIFE

## 2023-01-29 PROCEDURE — 258N000003 HC RX IP 258 OP 636: Performed by: ADVANCED PRACTICE MIDWIFE

## 2023-01-29 PROCEDURE — 85027 COMPLETE CBC AUTOMATED: CPT | Performed by: ADVANCED PRACTICE MIDWIFE

## 2023-01-29 PROCEDURE — 84450 TRANSFERASE (AST) (SGOT): CPT | Performed by: ADVANCED PRACTICE MIDWIFE

## 2023-01-29 PROCEDURE — 250N000009 HC RX 250: Performed by: ADVANCED PRACTICE MIDWIFE

## 2023-01-29 PROCEDURE — 86780 TREPONEMA PALLIDUM: CPT | Performed by: ADVANCED PRACTICE MIDWIFE

## 2023-01-29 PROCEDURE — 82565 ASSAY OF CREATININE: CPT | Performed by: ADVANCED PRACTICE MIDWIFE

## 2023-01-29 PROCEDURE — U0003 INFECTIOUS AGENT DETECTION BY NUCLEIC ACID (DNA OR RNA); SEVERE ACUTE RESPIRATORY SYNDROME CORONAVIRUS 2 (SARS-COV-2) (CORONAVIRUS DISEASE [COVID-19]), AMPLIFIED PROBE TECHNIQUE, MAKING USE OF HIGH THROUGHPUT TECHNOLOGIES AS DESCRIBED BY CMS-2020-01-R: HCPCS | Performed by: ADVANCED PRACTICE MIDWIFE

## 2023-01-29 PROCEDURE — C9803 HOPD COVID-19 SPEC COLLECT: HCPCS

## 2023-01-29 PROCEDURE — 86901 BLOOD TYPING SEROLOGIC RH(D): CPT | Performed by: ADVANCED PRACTICE MIDWIFE

## 2023-01-29 RX ORDER — PROCHLORPERAZINE 25 MG
25 SUPPOSITORY, RECTAL RECTAL EVERY 12 HOURS PRN
Status: DISCONTINUED | OUTPATIENT
Start: 2023-01-29 | End: 2023-01-30 | Stop reason: HOSPADM

## 2023-01-29 RX ORDER — LIDOCAINE 40 MG/G
CREAM TOPICAL
Status: DISCONTINUED | OUTPATIENT
Start: 2023-01-29 | End: 2023-01-30 | Stop reason: HOSPADM

## 2023-01-29 RX ORDER — OXYTOCIN/0.9 % SODIUM CHLORIDE 30/500 ML
PLASTIC BAG, INJECTION (ML) INTRAVENOUS
Status: DISCONTINUED
Start: 2023-01-29 | End: 2023-01-30 | Stop reason: HOSPADM

## 2023-01-29 RX ORDER — CITRIC ACID/SODIUM CITRATE 334-500MG
30 SOLUTION, ORAL ORAL
Status: DISCONTINUED | OUTPATIENT
Start: 2023-01-29 | End: 2023-01-30 | Stop reason: HOSPADM

## 2023-01-29 RX ORDER — PROCHLORPERAZINE MALEATE 10 MG
10 TABLET ORAL EVERY 6 HOURS PRN
Status: DISCONTINUED | OUTPATIENT
Start: 2023-01-29 | End: 2023-01-30 | Stop reason: HOSPADM

## 2023-01-29 RX ORDER — NALOXONE HYDROCHLORIDE 0.4 MG/ML
0.2 INJECTION, SOLUTION INTRAMUSCULAR; INTRAVENOUS; SUBCUTANEOUS
Status: DISCONTINUED | OUTPATIENT
Start: 2023-01-29 | End: 2023-01-30 | Stop reason: HOSPADM

## 2023-01-29 RX ORDER — MISOPROSTOL 200 UG/1
TABLET ORAL
Status: DISCONTINUED
Start: 2023-01-29 | End: 2023-01-30 | Stop reason: HOSPADM

## 2023-01-29 RX ORDER — LIDOCAINE HYDROCHLORIDE 10 MG/ML
INJECTION, SOLUTION EPIDURAL; INFILTRATION; INTRACAUDAL; PERINEURAL
Status: DISCONTINUED
Start: 2023-01-29 | End: 2023-01-30 | Stop reason: HOSPADM

## 2023-01-29 RX ORDER — TRANEXAMIC ACID 10 MG/ML
1 INJECTION, SOLUTION INTRAVENOUS EVERY 30 MIN PRN
Status: DISCONTINUED | OUTPATIENT
Start: 2023-01-29 | End: 2023-01-30 | Stop reason: HOSPADM

## 2023-01-29 RX ORDER — TERBUTALINE SULFATE 1 MG/ML
0.25 INJECTION, SOLUTION SUBCUTANEOUS
Status: DISCONTINUED | OUTPATIENT
Start: 2023-01-29 | End: 2023-01-30 | Stop reason: HOSPADM

## 2023-01-29 RX ORDER — CARBOPROST TROMETHAMINE 250 UG/ML
250 INJECTION, SOLUTION INTRAMUSCULAR
Status: DISCONTINUED | OUTPATIENT
Start: 2023-01-29 | End: 2023-01-30 | Stop reason: HOSPADM

## 2023-01-29 RX ORDER — KETOROLAC TROMETHAMINE 30 MG/ML
30 INJECTION, SOLUTION INTRAMUSCULAR; INTRAVENOUS
Status: DISCONTINUED | OUTPATIENT
Start: 2023-01-29 | End: 2023-01-31 | Stop reason: HOSPADM

## 2023-01-29 RX ORDER — FENTANYL CITRATE 50 UG/ML
100 INJECTION, SOLUTION INTRAMUSCULAR; INTRAVENOUS
Status: DISCONTINUED | OUTPATIENT
Start: 2023-01-29 | End: 2023-01-30 | Stop reason: HOSPADM

## 2023-01-29 RX ORDER — OXYTOCIN 10 [USP'U]/ML
10 INJECTION, SOLUTION INTRAMUSCULAR; INTRAVENOUS
Status: DISCONTINUED | OUTPATIENT
Start: 2023-01-29 | End: 2023-01-31 | Stop reason: HOSPADM

## 2023-01-29 RX ORDER — MISOPROSTOL 200 UG/1
400 TABLET ORAL
Status: DISCONTINUED | OUTPATIENT
Start: 2023-01-29 | End: 2023-01-30 | Stop reason: HOSPADM

## 2023-01-29 RX ORDER — OXYTOCIN/0.9 % SODIUM CHLORIDE 30/500 ML
340 PLASTIC BAG, INJECTION (ML) INTRAVENOUS CONTINUOUS PRN
Status: DISCONTINUED | OUTPATIENT
Start: 2023-01-29 | End: 2023-01-30 | Stop reason: HOSPADM

## 2023-01-29 RX ORDER — METOCLOPRAMIDE 10 MG/1
10 TABLET ORAL EVERY 6 HOURS PRN
Status: DISCONTINUED | OUTPATIENT
Start: 2023-01-29 | End: 2023-01-30 | Stop reason: HOSPADM

## 2023-01-29 RX ORDER — OXYTOCIN/0.9 % SODIUM CHLORIDE 30/500 ML
1-24 PLASTIC BAG, INJECTION (ML) INTRAVENOUS CONTINUOUS
Status: DISCONTINUED | OUTPATIENT
Start: 2023-01-29 | End: 2023-01-30 | Stop reason: HOSPADM

## 2023-01-29 RX ORDER — OXYTOCIN 10 [USP'U]/ML
INJECTION, SOLUTION INTRAMUSCULAR; INTRAVENOUS
Status: DISCONTINUED
Start: 2023-01-29 | End: 2023-01-30 | Stop reason: HOSPADM

## 2023-01-29 RX ORDER — NALOXONE HYDROCHLORIDE 0.4 MG/ML
0.4 INJECTION, SOLUTION INTRAMUSCULAR; INTRAVENOUS; SUBCUTANEOUS
Status: DISCONTINUED | OUTPATIENT
Start: 2023-01-29 | End: 2023-01-30 | Stop reason: HOSPADM

## 2023-01-29 RX ORDER — ONDANSETRON 4 MG/1
4 TABLET, ORALLY DISINTEGRATING ORAL EVERY 6 HOURS PRN
Status: DISCONTINUED | OUTPATIENT
Start: 2023-01-29 | End: 2023-01-30 | Stop reason: HOSPADM

## 2023-01-29 RX ORDER — METOCLOPRAMIDE HYDROCHLORIDE 5 MG/ML
10 INJECTION INTRAMUSCULAR; INTRAVENOUS EVERY 6 HOURS PRN
Status: DISCONTINUED | OUTPATIENT
Start: 2023-01-29 | End: 2023-01-30 | Stop reason: HOSPADM

## 2023-01-29 RX ORDER — SODIUM CHLORIDE, SODIUM LACTATE, POTASSIUM CHLORIDE, CALCIUM CHLORIDE 600; 310; 30; 20 MG/100ML; MG/100ML; MG/100ML; MG/100ML
INJECTION, SOLUTION INTRAVENOUS CONTINUOUS PRN
Status: DISCONTINUED | OUTPATIENT
Start: 2023-01-29 | End: 2023-01-30 | Stop reason: HOSPADM

## 2023-01-29 RX ORDER — OXYTOCIN 10 [USP'U]/ML
10 INJECTION, SOLUTION INTRAMUSCULAR; INTRAVENOUS
Status: DISCONTINUED | OUTPATIENT
Start: 2023-01-29 | End: 2023-01-30 | Stop reason: HOSPADM

## 2023-01-29 RX ORDER — MISOPROSTOL 100 UG/1
25 TABLET ORAL EVERY 4 HOURS PRN
Status: DISCONTINUED | OUTPATIENT
Start: 2023-01-29 | End: 2023-01-30 | Stop reason: HOSPADM

## 2023-01-29 RX ORDER — MISOPROSTOL 200 UG/1
800 TABLET ORAL
Status: DISCONTINUED | OUTPATIENT
Start: 2023-01-29 | End: 2023-01-30 | Stop reason: HOSPADM

## 2023-01-29 RX ORDER — ACETAMINOPHEN 325 MG/1
650 TABLET ORAL EVERY 4 HOURS PRN
Status: DISCONTINUED | OUTPATIENT
Start: 2023-01-29 | End: 2023-01-30 | Stop reason: HOSPADM

## 2023-01-29 RX ORDER — OXYTOCIN/0.9 % SODIUM CHLORIDE 30/500 ML
100-340 PLASTIC BAG, INJECTION (ML) INTRAVENOUS CONTINUOUS PRN
Status: DISCONTINUED | OUTPATIENT
Start: 2023-01-29 | End: 2023-01-31 | Stop reason: HOSPADM

## 2023-01-29 RX ORDER — ONDANSETRON 2 MG/ML
4 INJECTION INTRAMUSCULAR; INTRAVENOUS EVERY 6 HOURS PRN
Status: DISCONTINUED | OUTPATIENT
Start: 2023-01-29 | End: 2023-01-30 | Stop reason: HOSPADM

## 2023-01-29 RX ORDER — METHYLERGONOVINE MALEATE 0.2 MG/ML
200 INJECTION INTRAVENOUS
Status: DISCONTINUED | OUTPATIENT
Start: 2023-01-29 | End: 2023-01-30 | Stop reason: HOSPADM

## 2023-01-29 RX ORDER — IBUPROFEN 800 MG/1
800 TABLET, FILM COATED ORAL
Status: DISCONTINUED | OUTPATIENT
Start: 2023-01-29 | End: 2023-01-31 | Stop reason: HOSPADM

## 2023-01-29 RX ADMIN — FENTANYL CITRATE 100 MCG: 50 INJECTION INTRAMUSCULAR; INTRAVENOUS at 22:10

## 2023-01-29 RX ADMIN — SODIUM CHLORIDE, POTASSIUM CHLORIDE, SODIUM LACTATE AND CALCIUM CHLORIDE: 600; 310; 30; 20 INJECTION, SOLUTION INTRAVENOUS at 19:59

## 2023-01-29 RX ADMIN — Medication 2 MILLI-UNITS/MIN: at 20:07

## 2023-01-29 ASSESSMENT — ACTIVITIES OF DAILY LIVING (ADL)
ADLS_ACUITY_SCORE: 18

## 2023-01-29 NOTE — PLAN OF CARE
Data: Patient presented to [facility] at 1428.   Reason for maternal/fetal assessment per patient is Induction Of Labor  .  Patient is a . Prenatal record reviewed.      OB History    Para Term  AB Living   1 0 0 0 0 0   SAB IAB Ectopic Multiple Live Births   0 0 0 0 0      # Outcome Date GA Lbr Jimenez/2nd Weight Sex Delivery Anes PTL Lv   1 Current            . Medical history:   Past Medical History:   Diagnosis Date     Anxiety      Bipolar disorder (H)      Chlamydia      Varicella    . Gestational Age 41w6d. VSS. Fetal movement present. Patient denies vaginal discharge, pelvic pressure, UTI symptoms, GI problems, bloody show, vaginal bleeding, edema, headache, visual disturbances, epigastric or URQ pain, rupture of membranes. Support persons Leaf, pt mother, present.  Action: Verbal consent for EFM. Triage assessment completed. EFM applied for uterine and fetal assessment. Uterine assessment shows irregular uterine contractions. Fetal assessment: Presumed adequate fetal oxygenation documented (see flow record).   Response: VALARIE Szymanski CNM informed of patient arrival. Plan per provider is expectant management, pt SVE /-2 on arrival. Per provider pt ok to be on intermittent auscultation when category 1 reactive NST. Patient verbalized agreement with plan. Patient transferred to room 475 ambulatory, oriented to room and call light.

## 2023-01-29 NOTE — H&P
Palma Kim is a 27 year old female,    Patient's last menstrual period was 2022., Estimated Date of Delivery: 2023 is calculated from lmp alone     Pt [facility] on 20[facility] at 3:40 PM in early labor.  Membranes are bulging    HPI:   Palma began her prenatal care with [facility] team and then transferred to [facility] at 32w, as she desired an out of hospital unmedicated birth. Midwife from [facility] called to request BRISSA for IOL this afternoon due to postdates status. Midwife at [facility] did SVE at 1100 today and was fingertip/30%/-2. Palma took castor oil twice over last two evenings and has had painful contractions every 8-10 minutes since last night. Birth center does not accept patients after 42w. Since Palma is nearing 42w (at midnight tonight), midwife requested transfer of care.    Upon presenting to [facility], Palma is visibly uncomfortable with contractions and they palpate firm. She reports being unable to sleep last night due to contraction intensity.    PRENATAL COURSE  Prenatal care began at 16 wks gestation for a total of 14 prenatal visits.  Total wt gain 81 lb  Prenatal vital signs complicated by one elevated BP today of 134/92  Prenatal course was   complicated by    Patient Active Problem List    Diagnosis Date Noted     Encounter for induction of labor 2023     Priority: Medium     Restless leg syndrome 2022     Priority: Medium     Anxiety disorder, unspecified type 2022     Priority: Medium     Last Assessment & Plan:   Formatting of this note might be different from the original.  -Continue with quetiapine PRN for anxiety; reports not using daily PRn dose much  -Consider individual therapy  -F/U with missed appt w/OBGYN prenatal appt  -[place]/Mother Baby referral information provided (pt missed phone call today)    Last Assessment & Plan:   Formatting of this note might be different from the original.  -  Reported Quetiapine has been helpful in reducing night time anxiety and improving sleep. Reported occasional sedation.   - Quetiapine dosage was reduced as she reported she has been taking 25 mg q hs prn.  - Quetiapine 12.5 to 25 mg q hs prn for anxiety and sleep   - Medication education provided. Uses, risks, benefits, side effects and alternatives of the medication discussed.       GI symptoms 03/04/2022     Priority: Medium     Housing or economic circumstance 03/04/2022     Priority: Medium     Last Assessment & Plan:   Formatting of this note might be different from the original.  -Housed in own apt at this time though difficulties with facility  -Continue to provide support to maintain housing       Tobacco use disorder 04/17/2018     Priority: Medium     Last Assessment & Plan:   Formatting of this note might be different from the original.  -counseled on smoking cessation  -nicotine lozenge ordered       Mood disorder (H) 04/01/2018     Priority: Medium     Unspecified mood (affective) disorder (H) 04/01/2018     Priority: Medium     Last Assessment & Plan:   Formatting of this note might be different from the original.  -Refilled with quetiapine 25-50mg HS PRN sleep  -[place]/Mother Baby referral information provided (pt missed phone call today)  -Consider individual therapy    Last Assessment & Plan:   Formatting of this note might be different from the original.  - Reported improved mood. Said she has been using her coping skills. Said, Quetiapine has been helpful in calming her down.   - Medication education provided. Uses, risks, benefits, side effects and alternatives of the medication discussed.         HISTORIES  Allergies   Allergen Reactions     Doxycycline Nausea and Vomiting     GI upset when taking on empty stomach     Metronidazole Nausea and Vomiting     Penicillins Hives and Rash     Past Medical History:   Diagnosis Date     Anxiety      Bipolar disorder (H)      Chlamydia      Varicella       Past Surgical History:   Procedure Laterality Date     MOUTH SURGERY       Family History   Problem Relation Age of Onset     Diabetes Father      Social History     Tobacco Use     Smoking status: Former     Types: Cigarettes, Vaping Device     Quit date: 2023     Smokeless tobacco: Never   Substance Use Topics     Alcohol use: No     OB History    Para Term  AB Living   1 0 0 0 0 0   SAB IAB Ectopic Multiple Live Births   0 0 0 0 0      # Outcome Date GA Lbr Jimenez/2nd Weight Sex Delivery Anes PTL Lv   1 Current                LABS:  A pos  Rhogam not indicated  No results found for: RUBELLAABIGG   RPR nonreactive  HIV nonreactive  GBS neg  Lab Results   Component Value Date    HGB 10.5 10/07/2022    HGB 12.3 2018      Lab Results   Component Value Date    HEPBANG Nonreactive 2022       ROS  Pt denies significant constitutional symptoms including fever and/or malaise.  Pt denies significant respiratory, cardiovacular, GI, or muscular/skeletal complaints.      PHYSICAL EXAM:  Vital signs stable, afebrile and BP is 137/73  General appearance healthy, alert and active  Heart: RRR without murmur  Lungs:  clear to auscultation   Neuro:  denies headache and visual disturbances  Psych: Mentation normal and bright   Legs: 2+/2+, no clonus, no edema     Abdomen: gravid, single vertex fetus, non-tender, EFW 8.5 lbs. Pt is edwin every 4-6 minutes, lasting 60 seconds and palpates strong    FETAL HEART TONES: baseline 135 with moderate FHRV variability and accelerations. No decelerations present.     PELVIC EXAM: 6/ 90% / Mid/ soft/ -2 with bulging bag of water  BLOODY SHOW:: yes  Membranes as listed above    ASSESSMENT:   41w6d  NST  reactive  CST negative  Fetal-maternal status reassuring  Parity: Primip  GBS negative and membranes intact    PLAN:  Admit - see IP orders  Discussed with patient that she is laboring. Plan to observe for several hours but no need for augmentation at  this time  Anticipate     HEMA Rodriguez CNM

## 2023-01-30 LAB
HGB BLD-MCNC: 12.1 G/DL (ref 11.7–15.7)
T PALLIDUM AB SER QL: NONREACTIVE

## 2023-01-30 PROCEDURE — 4A1H7CZ MONITORING OF PRODUCTS OF CONCEPTION, CARDIAC RATE, VIA NATURAL OR ARTIFICIAL OPENING: ICD-10-PCS | Performed by: ADVANCED PRACTICE MIDWIFE

## 2023-01-30 PROCEDURE — 36415 COLL VENOUS BLD VENIPUNCTURE: CPT | Performed by: ADVANCED PRACTICE MIDWIFE

## 2023-01-30 PROCEDURE — 999N000016 HC STATISTIC ATTENDANCE AT DELIVERY

## 2023-01-30 PROCEDURE — 120N000002 HC R&B MED SURG/OB UMMC

## 2023-01-30 PROCEDURE — 59410 OBSTETRICAL CARE: CPT | Performed by: ADVANCED PRACTICE MIDWIFE

## 2023-01-30 PROCEDURE — 85018 HEMOGLOBIN: CPT | Performed by: ADVANCED PRACTICE MIDWIFE

## 2023-01-30 PROCEDURE — 250N000011 HC RX IP 250 OP 636: Performed by: ADVANCED PRACTICE MIDWIFE

## 2023-01-30 PROCEDURE — 722N000001 HC LABOR CARE VAGINAL DELIVERY SINGLE

## 2023-01-30 PROCEDURE — 250N000009 HC RX 250: Performed by: ADVANCED PRACTICE MIDWIFE

## 2023-01-30 PROCEDURE — 59300 EPISIOTOMY OR VAGINAL REPAIR: CPT | Performed by: OBSTETRICS & GYNECOLOGY

## 2023-01-30 PROCEDURE — 0DQR0ZZ REPAIR ANAL SPHINCTER, OPEN APPROACH: ICD-10-PCS | Performed by: OBSTETRICS & GYNECOLOGY

## 2023-01-30 PROCEDURE — 250N000013 HC RX MED GY IP 250 OP 250 PS 637: Performed by: ADVANCED PRACTICE MIDWIFE

## 2023-01-30 RX ORDER — ACETAMINOPHEN 325 MG/1
650 TABLET ORAL EVERY 6 HOURS PRN
Qty: 100 TABLET | Refills: 0 | Status: SHIPPED | OUTPATIENT
Start: 2023-01-30

## 2023-01-30 RX ORDER — IBUPROFEN 600 MG/1
600 TABLET, FILM COATED ORAL EVERY 6 HOURS PRN
Qty: 60 TABLET | Refills: 0 | Status: SHIPPED | OUTPATIENT
Start: 2023-01-30

## 2023-01-30 RX ORDER — LANOLIN ALCOHOL/MO/W.PET/CERES
1000 CREAM (GRAM) TOPICAL DAILY
Qty: 90 TABLET | Refills: 1 | Status: SHIPPED | OUTPATIENT
Start: 2023-01-30

## 2023-01-30 RX ORDER — DOCUSATE SODIUM 100 MG/1
100 CAPSULE, LIQUID FILLED ORAL 2 TIMES DAILY
Status: DISCONTINUED | OUTPATIENT
Start: 2023-01-30 | End: 2023-01-31 | Stop reason: HOSPADM

## 2023-01-30 RX ORDER — ACETAMINOPHEN 325 MG/1
650 TABLET ORAL EVERY 4 HOURS PRN
Status: DISCONTINUED | OUTPATIENT
Start: 2023-01-30 | End: 2023-01-31 | Stop reason: HOSPADM

## 2023-01-30 RX ORDER — MULTIVIT WITH MINERALS/LUTEIN
250 TABLET ORAL DAILY
Qty: 90 TABLET | Refills: 1 | Status: SHIPPED | OUTPATIENT
Start: 2023-01-30

## 2023-01-30 RX ORDER — OXYTOCIN/0.9 % SODIUM CHLORIDE 30/500 ML
340 PLASTIC BAG, INJECTION (ML) INTRAVENOUS CONTINUOUS PRN
Status: DISCONTINUED | OUTPATIENT
Start: 2023-01-30 | End: 2023-01-31 | Stop reason: HOSPADM

## 2023-01-30 RX ORDER — OXYTOCIN 10 [USP'U]/ML
10 INJECTION, SOLUTION INTRAMUSCULAR; INTRAVENOUS
Status: DISCONTINUED | OUTPATIENT
Start: 2023-01-30 | End: 2023-01-31 | Stop reason: HOSPADM

## 2023-01-30 RX ORDER — FERROUS SULFATE 325(65) MG
325 TABLET ORAL
Qty: 90 TABLET | Refills: 1 | Status: SHIPPED | OUTPATIENT
Start: 2023-01-30

## 2023-01-30 RX ORDER — HYDROCORTISONE 25 MG/G
CREAM TOPICAL 3 TIMES DAILY PRN
Status: DISCONTINUED | OUTPATIENT
Start: 2023-01-30 | End: 2023-01-31 | Stop reason: HOSPADM

## 2023-01-30 RX ORDER — MODIFIED LANOLIN
OINTMENT (GRAM) TOPICAL
Status: DISCONTINUED | OUTPATIENT
Start: 2023-01-30 | End: 2023-01-31 | Stop reason: HOSPADM

## 2023-01-30 RX ORDER — MISOPROSTOL 200 UG/1
400 TABLET ORAL
Status: DISCONTINUED | OUTPATIENT
Start: 2023-01-30 | End: 2023-01-31 | Stop reason: HOSPADM

## 2023-01-30 RX ORDER — AMOXICILLIN 250 MG
1 CAPSULE ORAL DAILY
Qty: 100 TABLET | Refills: 0 | Status: SHIPPED | OUTPATIENT
Start: 2023-01-30

## 2023-01-30 RX ORDER — MISOPROSTOL 200 UG/1
800 TABLET ORAL
Status: DISCONTINUED | OUTPATIENT
Start: 2023-01-30 | End: 2023-01-31 | Stop reason: HOSPADM

## 2023-01-30 RX ORDER — CARBOPROST TROMETHAMINE 250 UG/ML
250 INJECTION, SOLUTION INTRAMUSCULAR
Status: DISCONTINUED | OUTPATIENT
Start: 2023-01-30 | End: 2023-01-31 | Stop reason: HOSPADM

## 2023-01-30 RX ORDER — METHYLERGONOVINE MALEATE 0.2 MG/ML
200 INJECTION INTRAVENOUS
Status: DISCONTINUED | OUTPATIENT
Start: 2023-01-30 | End: 2023-01-31 | Stop reason: HOSPADM

## 2023-01-30 RX ORDER — TRANEXAMIC ACID 10 MG/ML
1 INJECTION, SOLUTION INTRAVENOUS EVERY 30 MIN PRN
Status: DISCONTINUED | OUTPATIENT
Start: 2023-01-30 | End: 2023-01-31 | Stop reason: HOSPADM

## 2023-01-30 RX ORDER — IBUPROFEN 800 MG/1
800 TABLET, FILM COATED ORAL EVERY 6 HOURS PRN
Status: DISCONTINUED | OUTPATIENT
Start: 2023-01-30 | End: 2023-01-31 | Stop reason: HOSPADM

## 2023-01-30 RX ADMIN — Medication 12.5 MG: at 23:17

## 2023-01-30 RX ADMIN — FENTANYL CITRATE 100 MCG: 50 INJECTION INTRAMUSCULAR; INTRAVENOUS at 04:16

## 2023-01-30 RX ADMIN — IBUPROFEN 800 MG: 800 TABLET, FILM COATED ORAL at 19:10

## 2023-01-30 RX ADMIN — DOCUSATE SODIUM 100 MG: 100 CAPSULE, LIQUID FILLED ORAL at 22:37

## 2023-01-30 RX ADMIN — KETOROLAC TROMETHAMINE 30 MG: 30 INJECTION, SOLUTION INTRAMUSCULAR; INTRAVENOUS at 05:48

## 2023-01-30 RX ADMIN — DOCUSATE SODIUM 100 MG: 100 CAPSULE, LIQUID FILLED ORAL at 07:51

## 2023-01-30 RX ADMIN — IBUPROFEN 800 MG: 800 TABLET, FILM COATED ORAL at 12:05

## 2023-01-30 RX ADMIN — ACETAMINOPHEN 650 MG: 325 TABLET ORAL at 23:16

## 2023-01-30 RX ADMIN — ACETAMINOPHEN 650 MG: 325 TABLET ORAL at 15:59

## 2023-01-30 RX ADMIN — Medication 340 ML/HR: at 03:21

## 2023-01-30 RX ADMIN — LIDOCAINE HYDROCHLORIDE 20 ML: 10 INJECTION, SOLUTION EPIDURAL; INFILTRATION; INTRACAUDAL; PERINEURAL at 05:00

## 2023-01-30 ASSESSMENT — ACTIVITIES OF DAILY LIVING (ADL)
ADLS_ACUITY_SCORE: 18

## 2023-01-30 NOTE — PLAN OF CARE
Data: Palma Kim transferred to postpartum via wheelchair at 0630. Baby transferred via parent's arms.  Action: Receiving unit notified of transfer: Yes. Patient and family notified of room change. Report given to Clarice RN at 0630. Belongings sent to receiving unit. Accompanied by Registered Nurse. Oriented patient to surroundings. Call light within reach. ID bands double-checked with receiving RN.  Response: Patient tolerated transfer and is stable.

## 2023-01-30 NOTE — PROGRESS NOTES
"S:  Palma is starting to feel exhausted. She has been laboring sitting on edge of bed, on birth ball, and in the tub. [place] has just arrived for support. Palma requests to try nitrous oxide for additional support.    O:  /73   Temp 97.9  F (36.6  C) (Oral)   Resp 16   Ht 1.676 m (5' 6\")   Wt 98 kg (216 lb)   LMP 2022   BMI 34.86 kg/m    Intermittent auscultation: baseline: 135; increases: heard; decreases: not heard; ctx q 3-7 min  SVE unchanged    Labor Course:  1540 admit to Birthplace, SVE 6/90/-2 bulging bag of water    A:   at 41w6d, here for labor  Labor status: early active labor  Gestational hypertension  GBS neg  Fetus Cat I  COVID-19 neg    P:  Discussed recommendation to add Pitocin due to no cervical change over last 4 hours. She agrees with plan.  Notified by RN of second elevated BP; preeclampsia labs ordered STAT    HEMA Rodriguez CNM      "

## 2023-01-30 NOTE — PROVIDER NOTIFICATION
01/29/23 1911   Vital Signs   BP (!) 140/89     VALARIE Szymanski CNM notified of elevated BP, pt denies Pre-E symptoms, provider to order additional labs.

## 2023-01-30 NOTE — PROGRESS NOTES
"S:  Palma is eager to start pushing and meet her baby.     O:  BP (!) 148/73 (BP Location: Left arm, Patient Position: Sitting, Cuff Size: Adult Regular)   Temp 98.4  F (36.9  C) (Oral)   Resp 20   Ht 1.676 m (5' 6\")   Wt 98 kg (216 lb)   LMP 2022   BMI 34.86 kg/m    FHT: baseline: 125; variability: moderate; accels: yes; decels: intermittent mild variables; ctx: q 2-3 min  Pitocin at 4mu/min  SVE complete/0    Labor Course:  23  1540 [facility] to [facility], SVE 6/90/-2 bulging bag of water  1900 SVE unchanged, recommend Pitocin augmentation   Pitocin start  1015 fentanyl x 1  2324 SVE 8/100/-1  2330 SROM with meconium stained amniotic fluid     23  0000 feeling urge to push, SVE unchanged  0100 9/100/-1  0145 anterior lip/-1; side lying release on both sides, Walcher's position  0220 SVE complete/0    A:   at 42w0d, here for augmentation after spontaneous labor  Labor status: active labor second stage  Pre-eclampsia without severe features  GBS neg  Fetus Cat I  COVID-19 neg    P:  Start pushing with support from uriel, RN, and CNM at bedside  Continue position changes  Anticipate     Justine Szymanski, APRN FRANCES      "

## 2023-01-30 NOTE — PROVIDER NOTIFICATION
01/30/23 1314   Provider Notification   Provider Name/Title Maryam Rojas ([facility], [facility])   Method of Notification Electronic Page   Request Evaluate-Remote   Notification Reason Status Update   Pt reported episode of feeling chest tightness and shortness of breath when up ambulating. VSS stable, SpO2 was 99%. Lungs sound clear. Any further orders? Thank you.     Per plan discussed with Maryam Rojas, continue to monitor as pt is no longer feeling shortness of breath or chest pain. IF shortness of breath or chest tightness returns, will notify provider. Provider will come and see pt later today.

## 2023-01-30 NOTE — PLAN OF CARE
Elevated BP, provider notified, labs ordered. Pt denies HA, visual changes, RUQ pain, SOB or chest pain. Plan to start oxytocin for labor augmentation, monitors applied. EFM as charted, see flowsheets. Pt coping well with contraction pain,  at bedside and supportive, pt using nitrous oxide to cope with labor pain. Pt also using, heating pad, counter pressure, birthing ball, tub/hydrotherapy, and aromatherapy for labor coping. Report given to VALARIE Castillo RN.

## 2023-01-30 NOTE — CARE PLAN
Patient arrived to [facility] unit via wheelchair at 6:30am,with belongings, accompanied by family, with infant in arms. Received report from Alo Veloz RN and checked bands. Unit and room orientation completd. Call light given; no concerns present at this time. Continue with plan of care.

## 2023-01-30 NOTE — PROVIDER NOTIFICATION
01/29/23 2228   Provider Notification   Provider Name/Title Neumeister   Method of Notification At Bedside   Request Evaluate in Person   Notification Reason Status Update       Neumeister at bedside to discuss preeclampsia diagnosis.

## 2023-01-30 NOTE — PLAN OF CARE
Data: Vital signs within normal limits except for slight tachycardia, midwife is aware. Postpartum checks within normal limits - see flow record. Patient eating and drinking normally. Patient able to empty bladder independently and is up ambulating. Pt did have a brief episode of dizzness and chest tightness when ambulating earlier but no other episodes since, midwife is aware (see provider notification note). No apparent signs of infection. Patient performing self cares and is able to care for infant. Breastfeeding baby on demand, working on getting a deeper latch. Lactation consult is released.   Action: Pain has been adequately managed with oral medications. Ice packs and tucks on for perineum pain and swelling.   Response: Positive attachment behaviors observed with infant. No support persons present this shift.   Plan: Continue with the plan of care. Report given to RAYNA Jade who assumes care of patient.

## 2023-01-30 NOTE — PROGRESS NOTES
Called to room for assessment and repair of laceration  Patient s/p unmedicated  at 42w0d   Exam reveals a 3c laceration.   Repair performed using local 1% plain lidocaine and fentanyl 100 mcg IV  Patient was able to relax adequately.   Sphincter repaired using 4 figure of X stitches of 2.0 vicryl  Remainder of the repair was done in the standard fashion using 3.0 vicryl.   Repair took 1 hour due to the complexity of the laceration.    No complications.   Patient tolerated it well.    Teresa Ortiz MD

## 2023-01-30 NOTE — PROGRESS NOTES
"S:  Palma is feeling some relief with IV fentanyl. She meets diagnostic criteria for preeclampsia without severe features. Denies HA, RUQ pain, visual changes.    O:  BP (!) 145/87 (BP Location: Right arm, Patient Position: Semi-Quispe's, Cuff Size: Adult Regular)   Temp 98.4  F (36.9  C) (Oral)   Resp 16   Ht 1.676 m (5' 6\")   Wt 98 kg (216 lb)   LMP 2022   BMI 34.86 kg/m    FHT: baseline: 135; variability: moderate; accels: yes; decels: intermittent late and early decels; ctx: q 2-3min  Pitocin at 4mu/min  Protein:creatinine ratio 0.38    Labor Course:  1540 [facility] to [facility], SVE /-2 bulging bag of water  1900 SVE unchanged, recommend Pitocin augmentation   Pitocin start  1015 fentanyl x 1      A:   at 41w6d, here for augmentation after spontaneous labor  Labor status: active labor  Pre-eclampsia without severe features  GBS neg  Fetus Cat I  COVID-19 neg    P:  Meets criteria for preeclampsia without severe features  Reassess SVE with next assessment    Justine Szymanski, HEMA DANIELS      "

## 2023-01-30 NOTE — PROGRESS NOTES
Pt had spontaneous vaginal delivery at 0320.  From 2330 to deliver received continuous 1:1 care from RN and midwife.  Strip reviewed constantly, repositioning performed with any decelerations and per patient comfort. Patient achieved goal of  without epidural.

## 2023-01-30 NOTE — PROGRESS NOTES
"S:  Palma is feeling contractions intensely after SROM. Supported at bedside by uriel Arroyogriselda and her mom Catie.     O:  BP (!) 145/87 (BP Location: Right arm, Patient Position: Semi-Quispe's, Cuff Size: Adult Regular)   Temp 98.4  F (36.9  C) (Oral)   Resp 16   Ht 1.676 m (5' 6\")   Wt 98 kg (216 lb)   LMP 2022   BMI 34.86 kg/m    FHT: baseline: 120; variability: moderate; accels: yes; decels: early decels; ctx: q 2-4 min   Pitocin at 4mu/min  SVE 8/100/0    Labor Course:  23  1540 [facility] to [facility], SVE 6/90/-2 bulging bag of water  1900 SVE unchanged, recommend Pitocin augmentation   Pitocin start  1015 fentanyl x 1  2324 SVE 8/100/-1  2330 SROM with meconium stained amniotic fluid    23  0000 feeling urge to push, SVE unchanged      A:   at 42w0d, here for augmentation after spontaneous labor  Labor status: active labor  Pre-eclampsia without severe features  GBS neg  Fetus Cat I  COVID-19 neg    P:  Support at bedside  Anticipate   NICU for delivery due to seroquel medication and MSAF    Justine Szymanski, HEMA DANIELS      "

## 2023-01-30 NOTE — L&D DELIVERY NOTE
OB Vaginal Delivery Note    Palma Kim MRN# [id number]   Age: 27 year old YOB: 1995     Labor Story:  Palma began feeling contractions on the evening of 1/28/23. She was receiving care from [facility]. She transferred to [facility] team for IOL as she was approaching 42w gestation. She arrived at hospital on 1/29/23 and SVE on admission was 6cm. She continued laboring spontaneously. Fetal heart tones monitored with IA after initial reactive NST. After about 4 hours, no cervical change noted, and Pitocin augmentation started. Continuous FHT monitoring started and Cat I throughout all of labor. She continued laboring with support of her uriel Rivera. She used IV fentanyl one time for pain support, but otherwise labored with her own inner resources and used many different positions to cope with labor. During labor, she had mild range elevated BP. Labs showed preeclampsia diagnosis without severe features. She experienced SROM with meconium stained fluid at 2330 and SVE was 8/100/-1. Fetus stayed high in pelvis over the next couple hours. She used side lying release position and Walcher's position and then felt increased pelvic pressure. She was checked and found to be complete at 0200. She started pushing and pushed in many positions including using squat bar, hands and knees, and side lying position. At 0315 fetal head began to crown. NICU called to room for delivery for meconium stained fluid and seroquel use in pregnancy. Baby crowned in MILAGRO position and was followed by shoulders and delivered through nuchal cord. Baby placed on Palma's abdomen. Baby had strong cry and good tone. After 4 minutes, cord was doubly clamped and cut by Catie, grandmother. Cord blood collected. Placenta delivered with gentle cord traction and found to be intact. Perineum inspected and third degree lac identified. Dr. Ortiz, OBGYN colleague called to room to assist with repair. Palma began latching  baby to breast and admiring her baby. She shared that baby's middle name will be her mom's name, Catie. It was a pleasure to attend this beautiful birth.     GA: 42w0d  GP:   Labor Complications: Dysfunctional Labor   EBL:   mL  Delivery QBL:    Delivery Type: Vaginal, Spontaneous   ROM to Delivery Time: (Delivered) Hours: 3 Minutes: 52   Weight:     1 Minute 5 Minute 10 Minute   Apgar Totals: 7   9        ROSA MARIA SMITH E;UMM TOMLIN A;CHIQUI RUSSO;LAURA COLLINS     Delivery Details:  Palma Kim, a 27 year old  female delivered a viable infant with apgars of 7  and 9 . Patient was fully dilated and pushing after   hours   minutes in active labor. Delivery was via vaginal, spontaneous  to a sterile field under none  anesthesia. Infant delivered in vertex  right  occiput  anterior  position. Anterior and posterior shoulders delivered without difficulty. The cord was clamped, cut twice and unknown  were noted. Cord blood was obtained in routine fashion with the following disposition: discard .      Cord complications: nuchal   Placenta delivered at 2023  3:30 AM . Placental disposition was Patient possesion . Fundal massage performed and fundus found to be firm.     Episiotomy:     Perineum, vagina, cervix were inspected, and the following lacerations were noted:   Perineal lacerations:                   Excellent hemostasis was noted. Needle count correct. Infant and patient in delivery room in good and stable condition.        Julio Female-Palma [[id number]]    Labor Event Times    Labor onset date: 23 Onset time:  1:00 AM   Dilation complete date: 23 Complete time:  2:20 AM   Start pushing date/time: 2023 0221      Labor Events     labor?: No   steroids: None  Labor Type: Spontaneous     Rupture date/time: 238   Rupture type: Spontaneous rupture of membranes occuring during spontaneous labor or augmentation  Fluid color:  Meconium  Fluid odor: Normal     Induction: Oxytocin  Induction date/time:     Cervical ripening date/time:     Indications for induction: Post-term Gestation     Augmentation: None     Delivery/Placenta Date and Time    Delivery Date: 23 Delivery Time:  3:20 AM   Placenta Date/Time: 2023  3:30 AM  Oxytocin given at the time of delivery: after delivery of baby  Delivering clinician: Justine Szymanski APRN CNM   Other personnel present at delivery:  Provider Role   Alo Veloz RN Hamblin, Caryl A, RN Millar, Dorothy Kathlee V, RN Neumeister, Gwendolyn Marie, APRN CNM Certified Nurse Midwife         Vaginal Counts     Initial count performed by 2 team members:  Two Team Members   Dot Veloz RN       Needles Suture Needles Sponges (RETIRED) Instruments   Initial counts 2  5    Added to count  1     Relief counts       Final counts             Placed during labor Accounted for at the end of labor   FSE No No   IUPC No No   Cervidil No No                     Apgars    Living status: Living   1 Minute 5 Minute 10 Minute 15 Minute 20 Minute   Skin color: 1  1       Heart rate: 2  2       Reflex irritability: 1  2       Muscle tone: 1  2       Respiratory effort: 2  2       Total: 7  9       Apgars assigned by: MATEO GARRIDO CNP     Cord    Vessels: Unknown    Cord Complications: Nuchal   Nuchal Intervention: delivered through         Nuchal cord description: loose nuchal cord         Cord Blood Disposition: Discard    Gases Sent?: No    Delayed cord clamping?: Yes    Cord Clamping Delay (seconds): >120 seconds    Stem cell collection?: No       [place] Resuscitation    Methods: None   Care at Delivery: NICU team was requested by Justine GARRIDO CNM to attend the delivery of this postterm, infant with a gestational age of 42 0/7 weeks secondary to meconium stained fluid.       Infant had spontaneous respirations at birth. Infant received delayed cord clamping  while on mom's chest. Baby was  dried, stimulated. Apgars were 7 at one minute and 9 at five minutes of age. Gross PE is WNL. Infant remains in the care of the L and D staff.     Maryam GARRIDO CNP 1/30/2023 3:31 AM        Labor Events and Shoulder Dystocia    Fetal Tracing Prior to Delivery: Category 1  Shoulder dystocia present?: Neg     Delivery (Maternal) (Provider to Complete) ([id number])       Blood Loss  Mother: Palma Kim #[id number]   Start of Mother's Information    Delivery Blood Loss  01/29/23 0100 - 01/30/23 0352    None           End of Mother's Information  Mother: Palma Kim #[id number]          Delivery - Provider to Complete ([id number])    Delivering clinician: Justine Szymanski APRN CNM  CNM Care: Exclusive CNM care in labor  Attempted Delivery Types (Choose all that apply): Spontaneous Vaginal Delivery  Delivery Type (Choose the 1 that will go to the Birth History): Vaginal, Spontaneous                   Other personnel:  Provider Role   Alo Veloz, Elizabeth Chu RN Millar, Dorothy Kathlee V, RN Neumeister, Gwendolyn Marie, APRN CNM Certified Nurse Midwife                Placenta    Date/Time: 1/30/2023  3:30 AM  Removal: Spontaneous  Disposition: Patient possesion           Anesthesia    Method: None                Presentation and Position    Presentation: Vertex    Position: Right Occiput Anterior                 Dot Alvarado APRN CNM

## 2023-01-31 ENCOUNTER — TELEPHONE (OUTPATIENT)
Dept: MIDWIFE SERVICES | Facility: CLINIC | Age: 28
End: 2023-01-31
Payer: COMMERCIAL

## 2023-01-31 VITALS
SYSTOLIC BLOOD PRESSURE: 123 MMHG | HEART RATE: 71 BPM | HEIGHT: 66 IN | BODY MASS INDEX: 34.72 KG/M2 | WEIGHT: 216 LBS | RESPIRATION RATE: 16 BRPM | TEMPERATURE: 98.2 F | DIASTOLIC BLOOD PRESSURE: 72 MMHG | OXYGEN SATURATION: 99 %

## 2023-01-31 LAB — HGB BLD-MCNC: 9.5 G/DL (ref 11.7–15.7)

## 2023-01-31 PROCEDURE — 250N000013 HC RX MED GY IP 250 OP 250 PS 637: Performed by: ADVANCED PRACTICE MIDWIFE

## 2023-01-31 PROCEDURE — 36415 COLL VENOUS BLD VENIPUNCTURE: CPT | Performed by: ADVANCED PRACTICE MIDWIFE

## 2023-01-31 PROCEDURE — 93010 ELECTROCARDIOGRAM REPORT: CPT | Performed by: INTERNAL MEDICINE

## 2023-01-31 PROCEDURE — 85018 HEMOGLOBIN: CPT | Performed by: ADVANCED PRACTICE MIDWIFE

## 2023-01-31 PROCEDURE — 93005 ELECTROCARDIOGRAM TRACING: CPT

## 2023-01-31 RX ADMIN — ACETAMINOPHEN 650 MG: 325 TABLET ORAL at 08:48

## 2023-01-31 RX ADMIN — BENZOCAINE AND LEVOMENTHOL: 200; 5 SPRAY TOPICAL at 12:27

## 2023-01-31 RX ADMIN — IBUPROFEN 800 MG: 800 TABLET, FILM COATED ORAL at 12:27

## 2023-01-31 RX ADMIN — ACETAMINOPHEN 650 MG: 325 TABLET ORAL at 04:33

## 2023-01-31 RX ADMIN — DOCUSATE SODIUM 100 MG: 100 CAPSULE, LIQUID FILLED ORAL at 08:51

## 2023-01-31 RX ADMIN — IBUPROFEN 800 MG: 800 TABLET, FILM COATED ORAL at 04:33

## 2023-01-31 ASSESSMENT — ACTIVITIES OF DAILY LIVING (ADL)
ADLS_ACUITY_SCORE: 18

## 2023-01-31 NOTE — PROGRESS NOTES
Post Partum Note    Palma is sitting in the chair this morning. Has had a few more episodes of chest tightness with activity. She reports that her bleeding is decreasing and breastfeeding is going well. Denies headache, vision changes, RUQ pain. Feels her edema is a bit more today than yesterday. She is hoping to discharge home this morning.    SIGNIFICANT PROBLEMS:  Patient Active Problem List    Diagnosis Date Noted     Encounter for induction of labor 01/29/2023     Priority: Medium     Restless leg syndrome 06/06/2022     Priority: Medium     Anxiety disorder, unspecified type 05/20/2022     Priority: Medium     Last Assessment & Plan:   Formatting of this note might be different from the original.  -Continue with quetiapine PRN for anxiety; reports not using daily PRn dose much  -Consider individual therapy  -F/U with missed appt w/OBGYN prenatal appt  -[place]/[facility] referral information provided (pt missed phone call today)    Last Assessment & Plan:   Formatting of this note might be different from the original.  - Reported Quetiapine has been helpful in reducing night time anxiety and improving sleep. Reported occasional sedation.   - Quetiapine dosage was reduced as she reported she has been taking 25 mg q hs prn.  - Quetiapine 12.5 to 25 mg q hs prn for anxiety and sleep   - Medication education provided. Uses, risks, benefits, side effects and alternatives of the medication discussed.       GI symptoms 03/04/2022     Priority: Medium     Housing or economic circumstance 03/04/2022     Priority: Medium     Last Assessment & Plan:   Formatting of this note might be different from the original.  -Housed in own apt at this time though difficulties with facility  -Continue to provide support to maintain housing       Tobacco use disorder 04/17/2018     Priority: Medium     Last Assessment & Plan:   Formatting of this note might be different from the original.  -counseled on smoking  "cessation  -nicotine lozenge ordered       Mood disorder (H) 04/01/2018     Priority: Medium     Unspecified mood (affective) disorder (H) 04/01/2018     Priority: Medium     Last Assessment & Plan:   Formatting of this note might be different from the original.  -Refilled with quetiapine 25-50mg HS PRN sleep  -[place]/[facility] referral information provided (pt missed phone call today)  -Consider individual therapy    Last Assessment & Plan:   Formatting of this note might be different from the original.  - Reported improved mood. Said she has been using her coping skills. Said, Quetiapine has been helpful in calming her down.   - Medication education provided. Uses, risks, benefits, side effects and alternatives of the medication discussed.         INTERVAL HISTORY:  /72 (BP Location: Left arm, Patient Position: Semi-Quispe's, Cuff Size: Adult Regular)   Pulse 71   Temp 98.2  F (36.8  C) (Oral)   Resp 16   Ht 1.676 m (5' 6\")   Wt 98 kg (216 lb)   LMP 03/18/2022   SpO2 99%   Breastfeeding Unknown   BMI 34.86 kg/m    Pt stable, baby is rooming in  Breast feeding status:initiated  Complications since 2 hours post delivery: intermittent chest tightness with activity  Patient is tolerating activity well, but patient complains of intermittent episodes of chest tightness/shortness of breath. Voiding without difficulty, cramping is relieved by Ibuprophen, lochia is decreasing and patient denies clots.  Perineal pain is is relieved by Ibuprophen.  The perineum laceration is well approximated    Postpartum hemoglobin   Hemoglobin   Date Value Ref Range Status   01/31/2023 9.5 (L) 11.7 - 15.7 g/dL Final   04/01/2018 12.3 11.7 - 15.7 g/dL Final     Blood type ABO: A  RH: POS  Rubella: immune  History of depression: yes. Discussed signs/symptoms of PP mood disorders    ASSESSMENT/PLAN:  Normal postpartum exam   Stable Post-partum day #1  Complications:anemic, plan for iron supplementation and EKG to evaluate " chest tightness. Offered IV iron, she declines. Will take oral as outpatient.   EKG shows NORMAL SINUS RHYTHM  Plan d/c home today  RTC 2 weeks  Teaching done: D/C Instructions: Nutrition/Activity, Engorgement Management, Birth Control Options, Warning Signs/When to Call: Excessive Bleeding, Infection, PP Depression, Kegals and Crunches, RTC Clinic for PP Appointment, PNV and Iron supplemenation  Birthcontrol planned:Undecided.     Current Discharge Medication List      START taking these medications    Details   acetaminophen (TYLENOL) 325 MG tablet Take 2 tablets (650 mg) by mouth every 6 hours as needed for mild pain Start after Delivery.  Qty: 100 tablet, Refills: 0    Associated Diagnoses:  (normal spontaneous vaginal delivery)      ferrous sulfate (FEROSUL) 325 (65 Fe) MG tablet Take 1 tablet (325 mg) by mouth daily (with breakfast)  Qty: 90 tablet, Refills: 1    Associated Diagnoses: Iron deficiency anemia, unspecified iron deficiency anemia type      ibuprofen (ADVIL/MOTRIN) 600 MG tablet Take 1 tablet (600 mg) by mouth every 6 hours as needed for moderate pain (4-6) Start after delivery  Qty: 60 tablet, Refills: 0    Associated Diagnoses:  (normal spontaneous vaginal delivery)      senna-docusate (SENOKOT-S/PERICOLACE) 8.6-50 MG tablet Take 1 tablet by mouth daily Start after delivery.  Qty: 100 tablet, Refills: 0    Associated Diagnoses:  (normal spontaneous vaginal delivery)         CONTINUE these medications which have CHANGED    Details   cyanocobalamin (VITAMIN B-12) 1000 MCG tablet Take 1 tablet (1,000 mcg) by mouth daily  Qty: 90 tablet, Refills: 1    Associated Diagnoses: Iron deficiency anemia, unspecified iron deficiency anemia type      vitamin C (ASCORBIC ACID) 250 MG tablet Take 1 tablet (250 mg) by mouth daily  Qty: 90 tablet, Refills: 1    Associated Diagnoses: Iron deficiency anemia, unspecified iron deficiency anemia type         CONTINUE these medications which have NOT CHANGED     Details   Prenatal Vit-Fe Fumarate-FA (PRENATAL PLUS) 27-1 MG TABS Take 1 tablet by mouth daily  Qty: 90 tablet, Refills: 1    Associated Diagnoses: Encounter for supervision of normal first pregnancy in third trimester      QUEtiapine (SEROQUEL) 25 MG tablet Take 12.5 mg by mouth         STOP taking these medications       iron (FERROUS GLUCONATE) 256 (28 Fe) MG tablet Comments:   Reason for Stopping:             Riley Chaidez CNM

## 2023-01-31 NOTE — DISCHARGE INSTRUCTIONS
Postpartum Vaginal Delivery Instructions    Activity     Ask family and friends for help when you need it.  Do not place anything in your vagina for 6 weeks.  You are not restricted on other activities, but take it easy for a few weeks to allow your body to recover from delivery.  You are able to do any activities you feel up to that point.  No driving until you have stopped taking your pain medications (usually two weeks after delivery).     Call your health care provider if you have any of these symptoms:     Increased pain, swelling, redness, or fluid around your stiches from an episiotomy or perineal tear.  A fever above 100.4 F (38 C) with or without chills when placing a thermometer under your tongue.  You soak a sanitary pad with blood within 1 hour, or you see blood clots larger than a golf ball.  Bleeding that lasts more than 6 weeks.  Vaginal discharge that smells bad.  Severe pain, cramping or tenderness in your lower belly area.  A need to urinate more frequently (use the toilet more often), more urgently (use the toilet very quickly), or it burns when you urinate.  Nausea and vomiting.  Redness, swelling or pain around a vein in your leg.  Problems breastfeeding or a red or painful area on your breast.  Chest pain and cough or are gasping for air.  Problems coping with sadness, anxiety, or depression.  If you have any concerns about hurting yourself or the baby, call your provider immediately.   You have questions or concerns after you return home.     Keep your hands clean:  Always wash your hands before touching your perineal area and stitches.  This helps reduce your risk of infection.  If your hands aren't dirty, you may use an alcohol hand-rub to clean your hands. Keep your nails clean and short.          Understanding Preeclampsia  Preeclampsia is high blood pressure (hypertension) that happens during pregnancy. It often shows up around the 20th week of pregnancy. It often goes back to normal by  the 12th week after you give birth. It can lead to serious health risks for you and your baby. During your pregnancy, your healthcare provider will watch your blood pressure.      Your blood pressure will be monitored regularly throughout your pregnancy to help check for preeclampsia.   Symptoms  A common symptom of preeclampsia is high blood pressure. Other symptoms may include:   Rapid weight gain  Protein in your urine  Headache  Belly (abdominal) pain on your right side  Vision problems. These include flashes or spots.  Swelling (edema) in your face or hands. This also often happens near the end of normal pregnancies, even without preeclampsia.  Tests you may have  Your healthcare provider will want to check your blood pressure throughout your pregnancy. If your blood pressure is high, you may have the following tests:   Urine tests to look for protein  Blood tests to confirm preeclampsia  Fetal monitoring to make sure that your baby is healthy  Treating preeclampsia  You may need to take a daily low dose of aspirin if you are at risk for preeclampsia. Preeclampsia almost always ends soon after you give birth. Until then, your healthcare provider can help manage your condition. If your symptoms are mild, you may need activity limits at home, including bed rest and no heavy lifting. If your symptoms are severe, you will stay in the hospital. Hospital treatment includes:   Activity limits to help control blood pressure. This means no heavy lifting and 8 hours per day lying down with the feet up.  Magnesium IV (intravenous) drip during labor to prevent seizures  Induced labor or surgical delivery by  section. Delivery is considered the cure for preeclampsia.  When to call your healthcare provider  Call your healthcare provider if swelling, weight gain, or other symptoms come on quickly or are severe. Some cases of preeclampsia are more severe than others. Your symptoms also may change or get worse as you  get closer to your due date.   Who s at risk?  No one knows what causes preeclampsia. Preeclampsia can happen in any pregnant woman. But it is more common in first-time pregnancies. Things that increase the risk include:   Previous pregnancies. You are at risk if you had preeclampsia, intrauterine growth retardation (IUGR),  birth, placental abruption, or fetal death in a past pregnancy.  Health history of mother. You are at risk if you have diabetes, high blood pressure, obesity, kidney disease, autoimmune disease such as lupus, or a family history of preeclampsia.  Current pregnancy. You are at risk if this is your first pregnancy, or if you have multiple fetuses, are younger than age 18 or older than 40, or used in vitro  fertilization.  Race. You are at risk if you are black.  Dangers of preeclampsia  If not treated, preeclampsia can cause problems for you and your baby. The placenta is the organ that nourishes your baby. It may tear away from the uterine wall. This can put the baby at risk for health problems (fetal distress) and premature birth. Preeclampsia can also cause these health problems:   Kidney failure or other organ damage  Seizures  Stroke  Once you give birth  In most cases, preeclampsia goes away on its own soon after you give birth. This is often by the 12th week after you deliver. Within days of delivery, your blood pressure, swelling, and other symptoms should get better. For some women, problems from preeclampsia can continue after delivery.   Postpartum preeclampsia that develops within the first 48 hours after delivery is rare. Another type of postpartum preeclampsia that develops more than 48 hours after delivery is called late-onset preeclampsia. It is also rare. Contact your healthcare provider right away if you have symptoms of preeclampsia after you deliver.   [employer] last reviewed this educational content on 2019-2021 [employer]. All rights  reserved. This information is not intended as a substitute for professional medical care. Always follow your healthcare professional's instructions.

## 2023-01-31 NOTE — PLAN OF CARE
Goal Outcome Evaluation:        VSS and assessment are WDL. Fundus midline, firm, and U/1. Lochia is scant. Pain adequately managed with tylenol and ibuprofen. Able to void . Third degree laceration. Breastfeeding going well so far, encouraged waking baby and feeding every 3 hour. Bonding well with . Continue with plan of care.

## 2023-01-31 NOTE — TELEPHONE ENCOUNTER
----- Message from HEMA Arreguin CNM sent at 1/30/2023  8:06 PM CST -----  Can you please reach out to patient to help her get scheduled for a 2 week telephone visit for postpartum mood check and also a 6 week routine PP visit?   Thanks,  Maryam

## 2023-01-31 NOTE — PROGRESS NOTES
Acupuncture Clinical Internship Intake and Treatment Documentation   [facility]    Date:  1/31/2023  Patient Name:  Palma Kim   YOB: 1995     Repeat Patient:  No  Has patient had acupoint/acupressure treatment before:  Yes    Signed consent placed in the medical record:  Yes  Patient/Family verbalizes understanding of risks and benefits:  Yes  Required information provided to patient:  Yes    Diagnosis:  Encounter for induction of labor [Z34.90]    Patient condition and treatment:  Postpartum   Reason for Intervention Today/Chief Complaint:  Help with sleep and wound healing     Isolation:  No  Type:  None    PRE-SCORE:  mild    Other Western medical information:  Na     Medications   Current Facility-Administered Medications:     acetaminophen (TYLENOL) tablet 650 mg, 650 mg, Oral, Q4H PRN, Neumeister, Justine Jeimy, APRN CNM, 650 mg at 01/31/23 0848    benzocaine-menthol (DERMOPLAST) topical spray, , Topical, 4x Daily PRN, Neumeister, Justine Jeimy, APRN CNM, Given at 01/31/23 1227    carboprost (HEMABATE) injection 250 mcg, 250 mcg, Intramuscular, Q15 Min PRN, Neumeister, Justine Jeimy, APRN CNM    docusate sodium (COLACE) capsule 100 mg, 100 mg, Oral, BID, Neumeister, Justine Jeimy, APRN CNM, 100 mg at 01/31/23 0851    hydrocortisone (Perianal) (ANUSOL-HC) 2.5 % cream, , Rectal, TID PRN, Neumeister, Justine Jeiym, APRN CNM    ibuprofen (ADVIL/MOTRIN) tablet 800 mg, 800 mg, Oral, Q6H PRN, Neumeister, Justine Jeimy, APRN CNM, 800 mg at 01/31/23 1227    ketorolac (TORADOL) injection 30 mg, 30 mg, Intravenous, Once PRN, 30 mg at 01/30/23 0548 **OR** ketorolac (TORADOL) injection 30 mg, 30 mg, Intramuscular, Once PRN **OR** ibuprofen (ADVIL/MOTRIN) tablet 800 mg, 800 mg, Oral, Once PRN, Neumeister, Justine Jeimy, APRN CNM    lanolin cream, , Topical, Q1H PRN, Justine Szymanski APRN CNM    methylergonovine (METHERGINE) injection 200 mcg, 200  mcg, Intramuscular, Q2H PRN, Neumeister, Justine Jeimy, APRN CNM    misoprostol (CYTOTEC) tablet 400 mcg, 400 mcg, Oral, ONCE PRN REPEAT PER INSTRUCTIONS **OR** misoprostol (CYTOTEC) tablet 800 mcg, 800 mcg, Rectal, ONCE PRN REPEAT PER INSTRUCTIONS, NeumeisterOwenJustine Jeimy, APRN CNM    No MMR Needed - Assessment: Patient does not need MMR vaccine, , Does not apply, Continuous PRN, Neumeister, Justine Jeimy, APRN CNM    No Tdap Needed - Assessment: Patient does not need Tdap vaccine, , Does not apply, Continuous PRN, Neumeister, Justine Jeimy, APRN CNM    oxytocin (PITOCIN) 30 units in 500 mL 0.9% NaCl infusion, 340 mL/hr, Intravenous, Continuous PRN, Neumeister, Justine Jeimy, APRN CNM    oxytocin (PITOCIN) 30 units in 500 mL 0.9% NaCl infusion, 100-340 mL/hr, Intravenous, Continuous PRN, Neumeister, Justine Jeimy, APRN CNM, Last Rate: 100 mL/hr at 01/30/23 0355, 100 mL/hr at 01/30/23 0355    oxytocin (PITOCIN) injection 10 Units, 10 Units, Intramuscular, Once PRN, Neumeister, Justine Jeimy, APRN CNM    oxytocin (PITOCIN) injection 10 Units, 10 Units, Intramuscular, Once PRN, Neumeister, Justine Jeimy, APRN CNM    QUEtiapine (SEROquel) half-tab 12.5 mg, 12.5 mg, Oral, Daily, Neumeister, Justine Munson APRN CNM, 12.5 mg at 01/30/23 2317    tranexamic acid 1 g in 100 mL NS IV bag (premix), 1 g, Intravenous, Q30 Min PRN, MarcelomeJustine harrison APRN CNARTHUR    Current Outpatient Medications   Medication Sig Dispense Refill    acetaminophen (TYLENOL) 325 MG tablet Take 2 tablets (650 mg) by mouth every 6 hours as needed for mild pain Start after Delivery. 100 tablet 0    cyanocobalamin (VITAMIN B-12) 1000 MCG tablet Take 1 tablet (1,000 mcg) by mouth daily 90 tablet 1    ferrous sulfate (FEROSUL) 325 (65 Fe) MG tablet Take 1 tablet (325 mg) by mouth daily (with breakfast) 90 tablet 1    ibuprofen (ADVIL/MOTRIN) 600 MG tablet Take 1 tablet (600 mg) by mouth every 6 hours as needed for moderate pain  (4-6) Start after delivery 60 tablet 0    senna-docusate (SENOKOT-S/PERICOLACE) 8.6-50 MG tablet Take 1 tablet by mouth daily Start after delivery. 100 tablet 0    vitamin C (ASCORBIC ACID) 250 MG tablet Take 1 tablet (250 mg) by mouth daily 90 tablet 1       Pre-Treatment Assessment  Chief Complaint/ Reason for Intervention Today:  Inability to sleep and aid wound healing from delivery  Chief Complaint Pre-Score:  mild   Describe:  Patient would like to aid sleep and wound healing from delivery  Pain Location:  Some upper and mid back pain   Pre Session Pain:  Mild  Pre Session Anxiety:  None  Pre Session Nausea:  None    10 Traditional Chinese Medicine Assessment Questions  - Cold/ Heat:  Neutral Temp, perfer warm hot drinks  - Sweat:  None  - Headaches/Body aches:  Some upper back to mid back px  - Chest/Abdomen:  Chest tightness and SOB during pregnancy and movement  - Digestion:  NA  - Bowel Movement/Urination:  NA  - Hearing/Vision:  NA  - Sleep (prior to hospital):  Unable to fall asleep and stay asleep, Patient also have vivid dream. 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  - ROOT:  Postpartum wound of the genital due to Qi Stagnation and Blood Stasis    Traditional Chinese Medicine Treatment  - ACUPUNCTURE:  LR 3, LI 4, SP 6, Yin Bailey  - NEEDLE COUNT: In: 7   Out: 7    Time In: 11:45  - OTHER:  Ear point Shenman, uterus, and external gential     Magnet informed consent signed and given:  No    Post Treatment Assessment  Chief complaint post score:  better  Post Session Observation:  Na   Patient/Family Education:  na  Verbal  information provided:  Yes  Written information provided:  No  All questions answered at time of treatment:  Yes    Treatment/Procedure(s) performed by:  Acupunture Intern: Sophia Mercado    Date: 1/31/2023     I attest that this acupuncture treatment was done under my supervision and this note is complete and true.     Supervising acupuncturist:    Deonte Youssef LAc [facility]    [facility] Faculty    [facility] Acupuncture license #: [id number]  P: [phone]

## 2023-01-31 NOTE — PROGRESS NOTES
Post Partum Note    Palma Kim      MRN#: [id number]  Age: 27 year old      YOB: 1995      Post-partum day #0    Patient has noted two separate occasions today where she felt a chest tightness accompanied by feeling like it was difficult to get a deep breath. First time happened when she was walking in the halls and it resolved when she sat down. The second time was in the shower which also resolved when she sat down. Vital signs have been stable with the exception of occasional tachycardia in the low 100's. BP WNL, afebrile. Asymptomatic currently while sitting in chair and breastfeeding baby.     SIGNIFICANT PROBLEMS:  Patient Active Problem List    Diagnosis Date Noted     Encounter for induction of labor 2023     Priority: Medium     Restless leg syndrome 2022     Priority: Medium     Anxiety disorder, unspecified type 2022     Priority: Medium     Last Assessment & Plan:   Formatting of this note might be different from the original.  -Continue with quetiapine PRN for anxiety; reports not using daily PRn dose much  -Consider individual therapy  -F/U with missed appt w/OBGYN prenatal appt  -[place]/Mother [facility] referral information provided (pt missed phone call today)    Last Assessment & Plan:   Formatting of this note might be different from the original.  - Reported Quetiapine has been helpful in reducing night time anxiety and improving sleep. Reported occasional sedation.   - Quetiapine dosage was reduced as she reported she has been taking 25 mg q hs prn.  - Quetiapine 12.5 to 25 mg q hs prn for anxiety and sleep   - Medication education provided. Uses, risks, benefits, side effects and alternatives of the medication discussed.       GI symptoms 2022     Priority: Medium     Housing or economic circumstance 2022     Priority: Medium     Last Assessment & Plan:   Formatting of this note might be different from the original.  -Housed in own apt at  "this time though difficulties with facility  -Continue to provide support to maintain housing       Tobacco use disorder 04/17/2018     Priority: Medium     Last Assessment & Plan:   Formatting of this note might be different from the original.  -counseled on smoking cessation  -nicotine lozenge ordered       Mood disorder (H) 04/01/2018     Priority: Medium     Unspecified mood (affective) disorder (H) 04/01/2018     Priority: Medium     Last Assessment & Plan:   Formatting of this note might be different from the original.  -Refilled with quetiapine 25-50mg HS PRN sleep  -[place]/[facility] referral information provided (pt missed phone call today)  -Consider individual therapy    Last Assessment & Plan:   Formatting of this note might be different from the original.  - Reported improved mood. Said she has been using her coping skills. Said, Quetiapine has been helpful in calming her down.   - Medication education provided. Uses, risks, benefits, side effects and alternatives of the medication discussed.         INTERVAL HISTORY:  /57 (BP Location: Right arm, Patient Position: Semi-Quispe's, Cuff Size: Adult Regular)   Pulse 101   Temp 98.3  F (36.8  C) (Oral)   Resp 16   Ht 1.676 m (5' 6\")   Wt 98 kg (216 lb)   LMP 03/18/2022   SpO2 99%   Breastfeeding Unknown   BMI 34.86 kg/m      Pt stable, baby is rooming in  Breast feeding status:initiated  Complications since 2 hours post delivery: None  Patient is tolerating acitivity well Voiding without difficulty, cramping is minimal and is relieved by Ibuprophen, lochia is decreasing and patient denies clots.  Perineal pain is is relieved by Ibuprophen.  The perineum laceration is well approximated    Normal postpartum exam     Postpartum hemoglobin   Hemoglobin   Date Value Ref Range Status   01/30/2023 12.1 11.7 - 15.7 g/dL Final   04/01/2018 12.3 11.7 - 15.7 g/dL Final     Blood type A+  Rubella immune    ASSESSMENT/PLAN:  Stable Post-partum day " #0  Complications:none  Plan d/c home tomorrow  RTC 2 weeks for a telephone check in and 6 weeks for a routine PP check  Teaching done: Birth Control Options, Warning Signs/When to Call: Excessive Bleeding, Infection, PP Depression, RTC Clinic for PP Appointment, PNV and Iron supplemenation    Postpartum warning s/s reviewed, including bleeding/clots, fever, mastitis, or depression    Birthcontrol planned:Undecided, briefly discussed options, would like to review at 6 week PP visit  Current Discharge Medication List      START taking these medications    Details   acetaminophen (TYLENOL) 325 MG tablet Take 2 tablets (650 mg) by mouth every 6 hours as needed for mild pain Start after Delivery.  Qty: 100 tablet, Refills: 0    Associated Diagnoses:  (normal spontaneous vaginal delivery)      ferrous sulfate (FEROSUL) 325 (65 Fe) MG tablet Take 1 tablet (325 mg) by mouth daily (with breakfast)  Qty: 90 tablet, Refills: 1    Associated Diagnoses: Iron deficiency anemia, unspecified iron deficiency anemia type      ibuprofen (ADVIL/MOTRIN) 600 MG tablet Take 1 tablet (600 mg) by mouth every 6 hours as needed for moderate pain (4-6) Start after delivery  Qty: 60 tablet, Refills: 0    Associated Diagnoses:  (normal spontaneous vaginal delivery)      senna-docusate (SENOKOT-S/PERICOLACE) 8.6-50 MG tablet Take 1 tablet by mouth daily Start after delivery.  Qty: 100 tablet, Refills: 0    Associated Diagnoses:  (normal spontaneous vaginal delivery)         CONTINUE these medications which have CHANGED    Details   cyanocobalamin (VITAMIN B-12) 1000 MCG tablet Take 1 tablet (1,000 mcg) by mouth daily  Qty: 90 tablet, Refills: 1    Associated Diagnoses: Iron deficiency anemia, unspecified iron deficiency anemia type      vitamin C (ASCORBIC ACID) 250 MG tablet Take 1 tablet (250 mg) by mouth daily  Qty: 90 tablet, Refills: 1    Associated Diagnoses: Iron deficiency anemia, unspecified iron deficiency anemia type          CONTINUE these medications which have NOT CHANGED    Details   Prenatal Vit-Fe Fumarate-FA (PRENATAL PLUS) 27-1 MG TABS Take 1 tablet by mouth daily  Qty: 90 tablet, Refills: 1    Associated Diagnoses: Encounter for supervision of normal first pregnancy in third trimester      QUEtiapine (SEROQUEL) 25 MG tablet Take 12.5 mg by mouth         STOP taking these medications       iron (FERROUS GLUCONATE) 256 (28 Fe) MG tablet Comments:   Reason for Stopping:           Plan for hgb in AM, was drawn today but it was only 4 hours after delivery.   Patient requested refill of iron as she needed to take it during pregnancy for iron deficiency anemia and felt that it was helpful and would like to continue taking it in the postpartum period.   Will send chart to clinic reception with request for them to reach out to help schedule a 2 week mood check and 6 week PP visit.   Maryam Rojas CNM

## 2023-01-31 NOTE — DISCHARGE SUMMARY
[facility]    Discharge Summary  Obstetrics    Date of Admission:  2023  Date of Discharge:  2023  Discharging Provider: Riley Chaidez CNM    Discharge Diagnoses   (O80)  (normal spontaneous vaginal delivery)  (primary encounter diagnosis)  Plan: Breast pump - Manual/Electric, acetaminophen         (TYLENOL) 325 MG tablet, ibuprofen         (ADVIL/MOTRIN) 600 MG tablet, senna-docusate         (SENOKOT-S/PERICOLACE) 8.6-50 MG tablet,         Postpartum Home Care Referral    (D50.9) Iron deficiency anemia, unspecified iron deficiency anemia type  Plan: cyanocobalamin (VITAMIN B-12) 1000 MCG tablet,         vitamin C (ASCORBIC ACID) 250 MG tablet,         ferrous sulfate (FEROSUL) 325 (65 Fe) MG tablet    History of Present Illness   Palma Kim is a 27 year old female who presented with early labor and post dates pregnancy. She was a transfer of care from [facility] due to late gestation. She had pitocin augmentation. During her labor, it was noted that Palma had preeclampsia without severe features due to mild range BPs and elevated Pr/Cr ratio. Palma had an  of a viable baby girl with complication of a 3rd degree perineal laceration. Repair of laceration done by Dr. Ortiz. Her postpartum course has been uncomplicated, except for occasional chest discomfort when active, EKG is negative and declines shortness of breath, feeling dizzy/lightheaded. She also had hemoglobin of 9.5 on PPD #1, so oral iron was initiated. She verbalizes readiness to discharge home with her infant.    Hospital Course   The patient's hospital course was unremarkable.  She recovered as anticipated and experienced no post-delivery complications. On discharge, her pain was well controlled. Vaginal bleeding is similar to peak menstrual flow.  Voiding without difficulty.  Ambulating well and tolerating a normal diet.  No fevers.  Breastfeeding well.  Infant  is stable.  She was discharged on post-partum day #1.    Post-partum hemoglobin:   Hemoglobin   Date Value Ref Range Status   2023 9.5 (L) 11.7 - 15.7 g/dL Final   2018 12.3 11.7 - 15.7 g/dL Final       [place] Depression Scale  Thoughts of Harming Self: Never  Total Score: 5      Riley Chaidez CNM    Discharge Disposition   Discharged to home   Condition at discharge: Stable    Primary Care Physician   Physician No Ref-Primary    Consultations This Hospital Stay   ANESTHESIOLOGY IP CONSULT  LACTATION IP CONSULT    Discharge Orders      Postpartum Home Care Referral      Activity    Activity as tolerated     Reason for your hospital stay    Maternity care     Follow Up    Follow up with provider in 2 weeks and 6 weeks for post-delivery checks     Discharge Instructions - Hypertensive disorders patients    High Blood pressure patients to follow up in clinic or via home care within one week for a blood pressure check     Breast pump - Manual/Electric    Breast Pump Documentation:  Manual/Electric Pump: To support adequate breast milk production and nutrition for infant.     I, the undersigned, certify that the above prescribed supplies are medically necessary for this patient and is both reasonable and necessary in reference to accepted standards of medical and necessary in reference to accepted standards of medical practice in the treatment of this patient's condition and is not prescribed as a convenience.     Diet    Resume previous diet     Discharge Medications   Current Discharge Medication List      START taking these medications    Details   acetaminophen (TYLENOL) 325 MG tablet Take 2 tablets (650 mg) by mouth every 6 hours as needed for mild pain Start after Delivery.  Qty: 100 tablet, Refills: 0    Associated Diagnoses:  (normal spontaneous vaginal delivery)      ferrous sulfate (FEROSUL) 325 (65 Fe) MG tablet Take 1 tablet (325 mg) by mouth daily (with breakfast)  Qty: 90 tablet,  Refills: 1    Associated Diagnoses: Iron deficiency anemia, unspecified iron deficiency anemia type      ibuprofen (ADVIL/MOTRIN) 600 MG tablet Take 1 tablet (600 mg) by mouth every 6 hours as needed for moderate pain (4-6) Start after delivery  Qty: 60 tablet, Refills: 0    Associated Diagnoses:  (normal spontaneous vaginal delivery)      senna-docusate (SENOKOT-S/PERICOLACE) 8.6-50 MG tablet Take 1 tablet by mouth daily Start after delivery.  Qty: 100 tablet, Refills: 0    Associated Diagnoses:  (normal spontaneous vaginal delivery)         CONTINUE these medications which have CHANGED    Details   cyanocobalamin (VITAMIN B-12) 1000 MCG tablet Take 1 tablet (1,000 mcg) by mouth daily  Qty: 90 tablet, Refills: 1    Associated Diagnoses: Iron deficiency anemia, unspecified iron deficiency anemia type      vitamin C (ASCORBIC ACID) 250 MG tablet Take 1 tablet (250 mg) by mouth daily  Qty: 90 tablet, Refills: 1    Associated Diagnoses: Iron deficiency anemia, unspecified iron deficiency anemia type         CONTINUE these medications which have NOT CHANGED    Details   Prenatal Vit-Fe Fumarate-FA (PRENATAL PLUS) 27-1 MG TABS Take 1 tablet by mouth daily  Qty: 90 tablet, Refills: 1    Associated Diagnoses: Encounter for supervision of normal first pregnancy in third trimester      QUEtiapine (SEROQUEL) 25 MG tablet Take 12.5 mg by mouth         STOP taking these medications       iron (FERROUS GLUCONATE) 256 (28 Fe) MG tablet Comments:   Reason for Stopping:             Allergies   Allergies   Allergen Reactions     Doxycycline Nausea and Vomiting     GI upset when taking on empty stomach     Metronidazole Nausea and Vomiting     Penicillins Hives and Rash

## 2023-01-31 NOTE — PLAN OF CARE
Goal Outcome Evaluation:    VSS. Endorses chest pain/pressure that she had earlier but said she hasn't had it since. Educated pt on letting us know if symptoms worsen. Labs to be drawn in the AM and midwives to reassess. Denies SOB, nausea, dizziness. PP assessments WDL. Fundus midline and firm, scant bleeding. Complains of perineal discomfort, using a donut pillow. Also using tucks pads, ice packs, and thee bottle. Taking tylenol and ibuprofen for pain. Pt is breastfeeding independently. Great bonding observed with luna Gutierrez. Mom and family friends present as support.     Continue to assess and assist as needed per POC.

## 2023-01-31 NOTE — PLAN OF CARE
Goal Outcome Evaluation:      Plan of Care Reviewed With: patient    Overall Patient Progress: improvingOverall Patient Progress: improving    Outcome Evaluation: VSS, postpartum assessment WDL, meeting all postpartum goal, pain well managed with ibuprofen and tylenol.  Reviewed all home medications and discharge instructions, denied questions or concerns.  Discharge to home today.

## 2023-01-31 NOTE — PLAN OF CARE
Goal Outcome Evaluation:      Plan of Care Reviewed With: patient    Overall Patient Progress: improvingOverall Patient Progress: improving    VSS and postpartum assessments WDL. Fundus firm at U/U. Scant lochia. Voiding without difficulty and tolerating regular diet. Up and ambulating with steady gait.  Independent with cares.  Bonding well with infant. Breastfeeding on cue. Pain managed with heat packs tylenol, and Ibuprofen. Will continue with postpartum cares and education per plan of care.

## 2023-02-01 LAB
ATRIAL RATE - MUSE: 89 BPM
DIASTOLIC BLOOD PRESSURE - MUSE: NORMAL MMHG
INTERPRETATION ECG - MUSE: NORMAL
P AXIS - MUSE: 56 DEGREES
PR INTERVAL - MUSE: 128 MS
QRS DURATION - MUSE: 86 MS
QT - MUSE: 348 MS
QTC - MUSE: 423 MS
R AXIS - MUSE: 54 DEGREES
SYSTOLIC BLOOD PRESSURE - MUSE: NORMAL MMHG
T AXIS - MUSE: 24 DEGREES
VENTRICULAR RATE- MUSE: 89 BPM

## 2023-02-02 ENCOUNTER — MEDICAL CORRESPONDENCE (OUTPATIENT)
Dept: HEALTH INFORMATION MANAGEMENT | Facility: CLINIC | Age: 28
End: 2023-02-02

## 2023-02-03 ENCOUNTER — OFFICE VISIT (OUTPATIENT)
Dept: MIDWIFE SERVICES | Facility: CLINIC | Age: 28
End: 2023-02-03
Payer: COMMERCIAL

## 2023-02-03 ENCOUNTER — TELEPHONE (OUTPATIENT)
Dept: NURSING | Facility: CLINIC | Age: 28
End: 2023-02-03
Payer: COMMERCIAL

## 2023-02-03 VITALS
BODY MASS INDEX: 33.57 KG/M2 | DIASTOLIC BLOOD PRESSURE: 83 MMHG | OXYGEN SATURATION: 100 % | SYSTOLIC BLOOD PRESSURE: 132 MMHG | WEIGHT: 208 LBS | HEART RATE: 94 BPM

## 2023-02-03 DIAGNOSIS — K59.00 CONSTIPATION, UNSPECIFIED CONSTIPATION TYPE: ICD-10-CM

## 2023-02-03 DIAGNOSIS — R14.1 GAS PAIN: ICD-10-CM

## 2023-02-03 DIAGNOSIS — R10.11 RIGHT UPPER QUADRANT PAIN: Primary | ICD-10-CM

## 2023-02-03 LAB
ALT SERPL W P-5'-P-CCNC: 24 U/L (ref 10–35)
AST SERPL W P-5'-P-CCNC: 31 U/L (ref 10–35)
CREAT SERPL-MCNC: 0.71 MG/DL (ref 0.51–0.95)
ERYTHROCYTE [DISTWIDTH] IN BLOOD BY AUTOMATED COUNT: 14.4 % (ref 10–15)
GFR SERPL CREATININE-BSD FRML MDRD: >90 ML/MIN/1.73M2
HCT VFR BLD AUTO: 31.3 % (ref 35–47)
HGB BLD-MCNC: 10.5 G/DL (ref 11.7–15.7)
MCH RBC QN AUTO: 32.9 PG (ref 26.5–33)
MCHC RBC AUTO-ENTMCNC: 33.5 G/DL (ref 31.5–36.5)
MCV RBC AUTO: 98 FL (ref 78–100)
PLATELET # BLD AUTO: 258 10E3/UL (ref 150–450)
RBC # BLD AUTO: 3.19 10E6/UL (ref 3.8–5.2)
WBC # BLD AUTO: 12.2 10E3/UL (ref 4–11)

## 2023-02-03 PROCEDURE — 82565 ASSAY OF CREATININE: CPT | Performed by: ADVANCED PRACTICE MIDWIFE

## 2023-02-03 PROCEDURE — 36415 COLL VENOUS BLD VENIPUNCTURE: CPT | Performed by: ADVANCED PRACTICE MIDWIFE

## 2023-02-03 PROCEDURE — 85027 COMPLETE CBC AUTOMATED: CPT | Performed by: ADVANCED PRACTICE MIDWIFE

## 2023-02-03 PROCEDURE — 84460 ALANINE AMINO (ALT) (SGPT): CPT | Performed by: ADVANCED PRACTICE MIDWIFE

## 2023-02-03 PROCEDURE — 84450 TRANSFERASE (AST) (SGOT): CPT | Performed by: ADVANCED PRACTICE MIDWIFE

## 2023-02-03 PROCEDURE — 99214 OFFICE O/P EST MOD 30 MIN: CPT | Mod: 24 | Performed by: ADVANCED PRACTICE MIDWIFE

## 2023-02-03 RX ORDER — POLYETHYLENE GLYCOL 3350 17 G/17G
17 POWDER, FOR SOLUTION ORAL DAILY
Qty: 510 G | Refills: 1 | Status: SHIPPED | OUTPATIENT
Start: 2023-02-03

## 2023-02-03 RX ORDER — SIMETHICONE 125 MG
125 TABLET,CHEWABLE ORAL 4 TIMES DAILY PRN
Qty: 60 TABLET | Refills: 1 | Status: SHIPPED | OUTPATIENT
Start: 2023-02-03

## 2023-02-03 NOTE — PROGRESS NOTES
S: Palma is here with complaints of RUQ pain. She is a , 5 days postpartum from [facility] with 3rd degree laceration. Labor was complicated by preeclampsia without severe features, with mild range BPs and elevated Pr/Cr ratio. She is doing well, but has noted intermittent RUQ pain along medial/lateral rib line. Ribs themselves don't hurt. She has been taking daily stool softeners, but has only had a couple BMs since her birth, wishes she could go more. Feels like she needs to go, but doesn't want to push. She reports that breastfeeding is going well, nipples are improved after starting to use lanolin. She reports being albarran, but overall, mood is stable. Sleeping is difficult because her baby sleeps during the day, and she doesn't sleep well during the day. Infant is up a lot at night. Her mom and grandmother are in the home and helpful. Her bleeding is decreasing, now dark red/brown and like a menses. Edema is stable, slightly decreased today. Denies headache, vision changes. Had a headache a few days ago for a couple hours, but it went away on its own.    O:/83   Pulse 94   Wt 94.3 kg (208 lb)   LMP 2022   SpO2 100%   Breastfeeding Yes   BMI 33.57 kg/m    Exam:  Constitutional: healthy, alert and tired  Gastrointestinal: Abdomen soft, No masses, organomegaly, fundus U/3; positive findings: tenderness to palpation along right medial/lateral upper quadrant just below ribs.  Musculoskeletal: extremities normal- no gross deformities noted, gait normal and +1 edema  Psychiatric: mentation appears normal and judgment and insight intact    A/P:  (R10.11) Right upper quadrant pain  (primary encounter diagnosis)  Plan: CBC with platelets, ALT, AST, Creatinine    (K59.00) Constipation, unspecified constipation type  Plan: polyethylene glycol (MIRALAX) 17 GM/Dose powder    (R14.1) Gas pain  Plan: simethicone (MYLICON) 125 MG chewable tablet    Discussed that her symptoms are likely related to  constipation/gas pain. Encouraged increasing stool softener and adding miralax and simethicone.  Will check LFTs, CBC and Cr, low suspicion for preeclampsia worsening as she has no other symptoms. Did not do Pr/Cr because of difficulty getting a clean sample PP.  MA here to help get her back into her MyChart  Return to care if symptoms worsen, or labs are concerning. Next postpartum appt at 2w PP for BP check.    Riley Chaidez CNM

## 2023-02-03 NOTE — TELEPHONE ENCOUNTER
Pt called stating that she is having pain in her upper right rib area.  Pt could not describe the pain but stated that it is not painful to touch. Pt denied visions changes, HA, swelling or other symptoms.  Discussed with CNM on-call pt to come to clinic for BP and check.    Called pt and let MAY

## 2023-02-17 ENCOUNTER — PRENATAL OFFICE VISIT (OUTPATIENT)
Dept: MIDWIFE SERVICES | Facility: CLINIC | Age: 28
End: 2023-02-17
Payer: COMMERCIAL

## 2023-02-17 VITALS
WEIGHT: 190 LBS | DIASTOLIC BLOOD PRESSURE: 79 MMHG | HEART RATE: 80 BPM | SYSTOLIC BLOOD PRESSURE: 136 MMHG | BODY MASS INDEX: 30.67 KG/M2 | TEMPERATURE: 98.5 F

## 2023-02-17 DIAGNOSIS — R30.0 DYSURIA: ICD-10-CM

## 2023-02-17 DIAGNOSIS — Z59.00 HOUSING LACK: ICD-10-CM

## 2023-02-17 DIAGNOSIS — N76.0 BV (BACTERIAL VAGINOSIS): Primary | ICD-10-CM

## 2023-02-17 DIAGNOSIS — B96.89 BV (BACTERIAL VAGINOSIS): Primary | ICD-10-CM

## 2023-02-17 LAB
ALBUMIN UR-MCNC: NEGATIVE MG/DL
APPEARANCE UR: CLEAR
BACTERIA #/AREA URNS HPF: ABNORMAL /HPF
BILIRUB UR QL STRIP: NEGATIVE
CLUE CELLS: PRESENT
COLOR UR AUTO: YELLOW
GLUCOSE UR STRIP-MCNC: NEGATIVE MG/DL
HGB UR QL STRIP: ABNORMAL
KETONES UR STRIP-MCNC: ABNORMAL MG/DL
LEUKOCYTE ESTERASE UR QL STRIP: ABNORMAL
NITRATE UR QL: NEGATIVE
PH UR STRIP: 5.5 [PH] (ref 5–7)
RBC #/AREA URNS AUTO: ABNORMAL /HPF
SP GR UR STRIP: 1.02 (ref 1–1.03)
SQUAMOUS #/AREA URNS AUTO: ABNORMAL /LPF
TRICHOMONAS, WET PREP: ABNORMAL
UROBILINOGEN UR STRIP-ACNC: 0.2 E.U./DL
WBC #/AREA URNS AUTO: ABNORMAL /HPF
WBC'S/HIGH POWER FIELD, WET PREP: ABNORMAL
YEAST, WET PREP: ABNORMAL

## 2023-02-17 PROCEDURE — 99024 POSTOP FOLLOW-UP VISIT: CPT | Performed by: ADVANCED PRACTICE MIDWIFE

## 2023-02-17 PROCEDURE — 81001 URINALYSIS AUTO W/SCOPE: CPT | Performed by: ADVANCED PRACTICE MIDWIFE

## 2023-02-17 PROCEDURE — 87086 URINE CULTURE/COLONY COUNT: CPT | Performed by: ADVANCED PRACTICE MIDWIFE

## 2023-02-17 PROCEDURE — 87210 SMEAR WET MOUNT SALINE/INK: CPT | Performed by: ADVANCED PRACTICE MIDWIFE

## 2023-02-17 RX ORDER — METRONIDAZOLE 7.5 MG/G
1 GEL VAGINAL DAILY
Qty: 70 G | Refills: 0 | Status: SHIPPED | OUTPATIENT
Start: 2023-02-17

## 2023-02-17 RX ORDER — PRENATAL VIT/IRON FUM/FOLIC AC 27MG-0.8MG
1 TABLET ORAL DAILY
Qty: 90 TABLET | Refills: 3 | Status: SHIPPED | OUTPATIENT
Start: 2023-02-17

## 2023-02-17 SDOH — ECONOMIC STABILITY - HOUSING INSECURITY: HOMELESSNESS UNSPECIFIED: Z59.00

## 2023-02-17 NOTE — PROGRESS NOTES
SUBJECTIVE:   Palma Kim is here for her 2-week postpartum checkup.     HPI: Palma is here today for blood pressure check. She was seen 2 weeks ago for this as well. She is doing well overall but struggling more with depression. She is on Seroquel but has not been good about taking it. She usually takes it at night time and it makes her sleepy. Her daughter has been keeping her up at night so having a hard time taking the Seroquel. She takes it about 3 our of 7 days. She does not like taking it every day because she feels tired and gets more irritated. She is able to get up and take care of her daughter when she takes it. Discussed that not taking it consistently will contribute to worsening symptoms. As well as the first two weeks of the postpartum period. She is living with her mother who is helping her get some rest otherwise she has no other help. She has a hard relationship with the FOB. She is connect with a  as well and discussed asking her about postpartum doulas. She would like to move out of her mother's house. She would like help from our team for housing, she is already on the housing list for [facility]. Will send in a care coordination referral today. She feels like she has a urinary infection or something going on. She would like to leave a urine sample. She has discomfort when she urinates. She feels like her bottom is healing well otherwise. Breastfeeding is going well. Blood pressure is good and now symptoms of pre-eclampsia. No other questions or concerns today.     DELIVERY DATE: 2023   of a viable girl, weight 8 pounds 9.9 oz., with Abruptio Placenta complications.  FEEDING METHOD:    CONTRACEPTION PLANNED: Nexplanon but not at this time.   She  has not had intercourse since delivery and complains of No discomfort.  HX OF DEPRESSION:Yes: see notes [id number]  OTHER HPI: She has no signs of infection, bleeding or other complications. Bleeding stopped, epis/lac  healing well.         EXAM:  /79   Pulse 80   Temp 98.5  F (36.9  C)   Wt 86.2 kg (190 lb)   LMP 2022   Breastfeeding Yes   BMI 30.67 kg/m    GENERAL APPEARANCE: healthy, alert and no distress  PELVIC EXAM:  Vulva: BUS WNL, no lesions noted  Vagina: Discharge yellow, no lesions, well rugated, good tone  Wet prep: Clue cells     ASSESSMENT:   Normal postpartum exam after .    PLAN:  1. Follow up as planned at 6 week appointment  2. BHT will check in with her on Tuesday to make sure she was able to get in touch with her psychiatrist on Monday and make an appointment to come up with a new plan for medications. She is unsure if she wants to stop Seroquel but also does not want to take it daily. Discussed either keeping it and taking it daily or switching to a new medication. Discussed if she is unable to get in touch with her psychiatrist in a reasonable amount of time we will figure out a plan. She is in close contact with them and her therapist though. She would like to transition to both a new therapist and psychiatrist, T will also help address that.      (N76.0,  B96.89) BV (bacterial vaginosis)  (primary encounter diagnosis)  Plan: metroNIDAZOLE (METROGEL) 0.75 % vaginal gel    (R30.0) Dysuria  Plan: UA with Microscopic reflex to Culture - lab         collect, Urine Culture Aerobic Bacterial - lab         collect, Urine Microscopic, Urine Culture    (Z39.2) Routine postpartum follow-up  Plan: Prenatal Vit-Fe Fumarate-FA (PRENATAL         MULTIVITAMIN W/IRON) 27-0.8 MG tablet, Wet         preparation, Primary Care - Care Coordination         Referral    (Z59.00) Housing lack  Plan: Primary Care - Care Coordination Referral    HEMA Flanagan CNM

## 2023-02-17 NOTE — LETTER
Palma Kim   [address] [place] [address]        February 17, 2023        To Whom it May Concern,       Palma Kim is a patient at [facility]. She delivered her baby on 1/30/2023. She had a vaginal delivery. Please use this letter as confirmation of her vaginal delivery. Thank you for your consideration. Please contact us at the number above with questions or concerns.     Sincerely,     HEMA Flanagan CNM

## 2023-02-19 LAB — BACTERIA UR CULT: NO GROWTH

## 2023-02-20 ENCOUNTER — PATIENT OUTREACH (OUTPATIENT)
Dept: CARE COORDINATION | Facility: CLINIC | Age: 28
End: 2023-02-20
Payer: COMMERCIAL

## 2023-02-21 ENCOUNTER — TELEPHONE (OUTPATIENT)
Dept: BEHAVIORAL HEALTH | Facility: CLINIC | Age: 28
End: 2023-02-21
Payer: COMMERCIAL

## 2023-02-21 ENCOUNTER — PATIENT OUTREACH (OUTPATIENT)
Dept: CARE COORDINATION | Facility: CLINIC | Age: 28
End: 2023-02-21
Payer: COMMERCIAL

## 2023-02-21 NOTE — PROGRESS NOTES
Clinic Care Coordination Contact  Care Team Conversations    Chart Review: PCP referral from Angélica Lux CNM, on 2/17/23:    Other - housing support, post partum resources    Additional information: Please reach out in a few weeks. She wants a little bit of time to continue adjusting to her new baby.    2/20/23: CHW routed the following teams message to Angélica Lux CNM, at 2:41 pm:     Mina Lindsay. I am the community health worker out of the [facility]. I received the care coordination referral you placed 2/17/23 for Palma Kim, [phone]. I just wanted to clarify that we should reach out to her in 2 weeks, so the week of 3/6/23, rather then call out to her today. Thanks for clarifying, Angélica.     2/21/23: CHW has not heard back from FRANCES Lindsay. Discussed this situation with MURRAY Tim RN. Meeta suggests we push initiate contact with pt for CC until 3/6/23.    CHW Plan: CHW will initiate contact with pt on 3/6/23, per Angélica SchmidsFRANCES, request on 2/17/23. CHW will route this encounter to MURRAY Tim RN, and Angélica Lux CNM.     Megan Diaz  Community Health Worker  [facility]  [phone]

## 2023-02-21 NOTE — TELEPHONE ENCOUNTER
Called patient per nurse midwife's request to see whether patient was able to schedule with psychiatrist and discuss therapy options. Patient shared that she called her psych provider yesterday and had to leave a VM, and is hoping for a call back soon. She expressed interest in working with a therapist specializing in postpartum MH issues, and will discuss this interest with her current therapist. [facility] will send suggestions for postpartum mental health providers via [employer]. Patient understands she is welcome to reach out to [facility] if she has any questions or barriers to finding a new therapist.     Patient reported that she has been experiencing irritability, diminished appetite, and symptoms of depression and anxiety. Denied suicidal ideation, concerns for thought disturbance, and safety concerns.

## 2023-03-06 ENCOUNTER — PATIENT OUTREACH (OUTPATIENT)
Dept: CARE COORDINATION | Facility: CLINIC | Age: 28
End: 2023-03-06
Payer: COMMERCIAL

## 2023-03-06 NOTE — LETTER
[facility] CARE COORDINATION  [address]  [place] [address]    March 7, 2023    Palma Kim  [address]  [facility] [address]      Dear Palma,        I am a clinic community health worker who works with Angélica Lux CNM, with the [facility]. I wanted to introduce myself and provide you with my contact information for you to be able to call me with any questions or concerns. Below is a description of clinic care coordination and how I can further assist you.       The clinic care coordination team is made up of a registered nurse, , financial resource worker and community health worker who understand the health care system. The goal of clinic care coordination is to help you manage your health and improve access to the health care system. Our team works alongside your provider to assist you in determining your health and social needs. We can help you obtain health care and community resources, providing you with necessary information and education. We can work with you through any barriers and develop a care plan that helps coordinate and strengthen the communication between you and your care team.    Please feel free to contact me with any questions or concerns regarding care coordination and what we can offer.      We are focused on providing you with the highest-quality healthcare experience possible.    Sincerely,     Megan Diaz  Community Health Worker  [facility]  [phone]

## 2023-03-06 NOTE — PROGRESS NOTES
Clinic Care Coordination Contact  [facility]/Voicemail    Chart Review: PCP referral from Angélica Lux CNM, on 2/17/23:    Other - housing support, post partum resources    Additional information: Please reach out in a few weeks. She wants a little bit of time to continue adjusting to her new baby.     See Itzel Diggs's communication with pt on 2/21/23.    Clinical Data: Care Coordinator Outreach  Outreach attempted x 1.  Left message on patient's voicemail with call back information and requested return call.  Plan:  Care Coordinator will try to reach patient again in 1-2 business days.    Megan Diaz  Community Health Worker  [facility]  [phone]

## 2023-03-07 NOTE — PROGRESS NOTES
Clinic Care Coordination Contact  [facility]/Voicemail      Chart Review: PCP referral from Angélica Lux CNM, on 2/17/23:    Other - housing support, post partum resources    Additional information: Please reach out in a few weeks. She wants a little bit of time to continue adjusting to her new baby.     See Itzel Diggs's communication with pt on 2/21/23.      Clinical Data: Care Coordinator Outreach  Outreach attempted x 2.  Left message on patient's voicemail with call back information and requested return call.  Plan: Care Coordinator will send care coordination introduction letter with care coordinator contact information and explanation of care coordination services via [employer]. Care Coordinator will do no further outreaches at this time.    Megan Diaz  Community Health Worker  [facility]  [phone]

## 2023-03-15 ENCOUNTER — PRENATAL OFFICE VISIT (OUTPATIENT)
Dept: MIDWIFE SERVICES | Facility: CLINIC | Age: 28
End: 2023-03-15
Payer: COMMERCIAL

## 2023-03-15 VITALS
BODY MASS INDEX: 31.02 KG/M2 | HEIGHT: 66 IN | HEART RATE: 77 BPM | OXYGEN SATURATION: 100 % | WEIGHT: 193 LBS | DIASTOLIC BLOOD PRESSURE: 72 MMHG | SYSTOLIC BLOOD PRESSURE: 124 MMHG

## 2023-03-15 DIAGNOSIS — N76.0 BV (BACTERIAL VAGINOSIS): ICD-10-CM

## 2023-03-15 DIAGNOSIS — F31.70 BIPOLAR DISORDER IN PARTIAL REMISSION, MOST RECENT EPISODE UNSPECIFIED TYPE (H): ICD-10-CM

## 2023-03-15 DIAGNOSIS — B96.89 BV (BACTERIAL VAGINOSIS): ICD-10-CM

## 2023-03-15 DIAGNOSIS — N89.8 VAGINAL DISCHARGE: ICD-10-CM

## 2023-03-15 DIAGNOSIS — R30.0 DYSURIA: ICD-10-CM

## 2023-03-15 LAB
ALBUMIN UR-MCNC: NEGATIVE MG/DL
APPEARANCE UR: CLEAR
BACTERIA #/AREA URNS HPF: ABNORMAL /HPF
BILIRUB UR QL STRIP: NEGATIVE
CLUE CELLS: PRESENT
COLOR UR AUTO: YELLOW
GLUCOSE UR STRIP-MCNC: NEGATIVE MG/DL
HGB UR QL STRIP: ABNORMAL
KETONES UR STRIP-MCNC: NEGATIVE MG/DL
LEUKOCYTE ESTERASE UR QL STRIP: NEGATIVE
NITRATE UR QL: NEGATIVE
PH UR STRIP: 5.5 [PH] (ref 5–7)
RBC #/AREA URNS AUTO: ABNORMAL /HPF
SP GR UR STRIP: 1.02 (ref 1–1.03)
SQUAMOUS #/AREA URNS AUTO: ABNORMAL /LPF
TRICHOMONAS, WET PREP: ABNORMAL
UROBILINOGEN UR STRIP-ACNC: 0.2 E.U./DL
WBC #/AREA URNS AUTO: ABNORMAL /HPF
WBC'S/HIGH POWER FIELD, WET PREP: ABNORMAL
YEAST, WET PREP: ABNORMAL

## 2023-03-15 PROCEDURE — 81001 URINALYSIS AUTO W/SCOPE: CPT | Performed by: ADVANCED PRACTICE MIDWIFE

## 2023-03-15 PROCEDURE — 87210 SMEAR WET MOUNT SALINE/INK: CPT | Performed by: ADVANCED PRACTICE MIDWIFE

## 2023-03-15 PROCEDURE — 99213 OFFICE O/P EST LOW 20 MIN: CPT | Performed by: ADVANCED PRACTICE MIDWIFE

## 2023-03-15 PROCEDURE — 99207 PR POST PARTUM EXAM: CPT | Performed by: ADVANCED PRACTICE MIDWIFE

## 2023-03-15 RX ORDER — METRONIDAZOLE 500 MG/1
500 TABLET ORAL 2 TIMES DAILY
Qty: 14 TABLET | Refills: 0 | Status: SHIPPED | OUTPATIENT
Start: 2023-03-15 | End: 2023-03-22

## 2023-03-15 NOTE — PROGRESS NOTES
"SUBJECTIVE:   Palma Kmi is a  here for her 6-week postpartum checkup.     HPI: Palma feels well, is adjusting to life with her  and has experienced a normal recovery without pain or complications.     Today's Depression Rating was No Value exists for the : HP#PHQ9    DELIVERY DATE:    of a viable girl, weight 8 pounds 9 oz., with no complications.  FEEDING METHOD:    CONTRACEPTION PLANNED: Nexplanon  She  has not had intercourse since delivery and complains of No discomfort.  HX OF DEPRESSION:Yes: Accepts referral to psychiatry. Seroquel not working r/t sleepiness side effects. Does not report any current depression or angela, would like psychiatry to recommend any other medication option.  HX OF ABUSE:No  OTHER HPI: She has no signs of infection, bleeding or other complications. Bleeding stopped, epis/lac healing well.         EXAM:  /72   Pulse 77   Ht 1.676 m (5' 6\")   Wt 87.5 kg (193 lb)   LMP  (LMP Unknown)   SpO2 100%   Breastfeeding Yes   BMI 31.15 kg/m    GENERAL APPEARANCE: healthy, alert and no distress  NECK: thyroid normal to palpation  BREAST: soft, nontender, nipples intact, lactating   ABDOMEN: soft, nontender, diastasis recti closing  PSYCH: mentation appears normal and affect normal/bright  PELVIC EXAM:  Vulva: BUS WNL, no lesions noted,   Vagina: Discharge copious and yellow, no lesions, well rugated, good tone,   Cervix: Smooth, pink, no visible lesions, neg CMT,   Uterus: Normal size and position, non-tender, mobile,   Ovaries: No masses palpable, non-tender, mobile,   Rectal exam: deferred        ASSESSMENT:   Normal postpartum exam after .    (N89.8) Vaginal discharge  (primary encounter diagnosis)  Comment:   Plan: Wet prep - Clinic Collect            (R30.0) Dysuria  Comment:   Plan: UA with Microscopic reflex to Culture - lab         collect, UA Microscopic with Reflex to Culture            (F31.70) Bipolar disorder in partial " remission, most recent episode unspecified type (H)  Comment:   Plan: Adult Mental Health  Referral            (N76.0,  B96.89) BV (bacterial vaginosis)  Comment:   Plan: metroNIDAZOLE (FLAGYL) 500 MG tablet                PLAN:  Birth Control - will return for nexplanon. NOt currently sexually active. Fertility reviewed.   Return as needed or at time interval for next routine pap, pelvic, or breast exam.  Encourage Kegals and abdominal exercise. Slow, steady weight loss.  Continue a multi vitamin supplement, especially if breastfeeding.  Pap smear was obtained.  GC/CHLAMYDIA CULTURE OBTAINED:PATIENT DECLINED  Post partum Hgb was not obtained.    Belén Stein, STUM, APRN CNM,CNM

## 2023-03-15 NOTE — RESULT ENCOUNTER NOTE
Dear Palma,    Your test results are attached below. The test is still positive for bacterial vaginosis HOWEVER, the discharge that was tested was from the glob of black metrogel we took from your vaginal. So it had been there since you had your infection. I do not think we should treat it again but rather wait because your symptoms are improving and I expect you will feel much more comfortable now that that glob of discharge and metrogel has been removed.   If you have any questions, please contact me via [employer] or you can call our office at [phone].    Belén Barrientos, STUM, APRN STUM, CNM

## 2023-03-16 PROCEDURE — G0145 SCR C/V CYTO,THINLAYER,RESCR: HCPCS | Performed by: ADVANCED PRACTICE MIDWIFE

## 2023-03-21 LAB
BKR LAB AP GYN ADEQUACY: NORMAL
BKR LAB AP GYN INTERPRETATION: NORMAL
BKR LAB AP HPV REFLEX: NORMAL
BKR LAB AP PREVIOUS ABNORMAL: NORMAL
PATH REPORT.COMMENTS IMP SPEC: NORMAL
PATH REPORT.COMMENTS IMP SPEC: NORMAL
PATH REPORT.RELEVANT HX SPEC: NORMAL

## 2023-09-30 ENCOUNTER — HEALTH MAINTENANCE LETTER (OUTPATIENT)
Age: 28
End: 2023-09-30

## 2024-11-23 ENCOUNTER — HEALTH MAINTENANCE LETTER (OUTPATIENT)
Age: 29
End: 2024-11-23

## 2025-01-08 ENCOUNTER — NURSE TRIAGE (OUTPATIENT)
Dept: FAMILY MEDICINE | Facility: CLINIC | Age: 30
End: 2025-01-08
Payer: COMMERCIAL

## 2025-01-08 NOTE — TELEPHONE ENCOUNTER
Pt calling in with respiratory symptoms.   Pt was recently out of town.  Sunday night started feeling sick: lightheaded, felt chills, felt feverish; hands & feet felt numb off and on; sinus headache.  Monday felt like a cold (sneezing, coughing).  Tingling in hands/feet went away on Monday.  Nauseous a bit, which subsided after eating.  Today feels as though hands are numb, bad headache, bad cough.  Pt does not have PCP.   Wants to start being seen at [facility].   Made appt at [facility] already for tomorrow.  Numbness comes and goes, but today more constant.  Both hands, not one side.    Advised pt be seen in an UC today.   Unable to 2nd level triage - no PCP established.   Pt understands recommendation to present to local UC, but would also like to keep appt on schedule for tomorrow.   All questions were answered.     Advised patient to call back if symptoms worsen or new symptoms arise. Advised patient to present to emergency department in the case of medical emergency. Red flag symptoms reviewed with patient.     Isadora ORLANDO RN        Reason for Disposition   Sinus pain (not just congestion) and fever   Patient wants to be seen    Additional Information   Negative: SEVERE weakness (e.g., unable to walk or barely able to walk, requires support) and new-onset or getting worse   Negative: Difficult to awaken or acting confused (e.g., disoriented, slurred speech)   Negative: Sudden onset of weakness of the face, arm or leg on one side of the body and present now (Exception: Bell's palsy suspected: weakness on one side of the face developing over hours to days, with no other symptoms.)   Negative: Sudden onset of numbness of the face, arm or leg on one side of the body and present now   Negative: Sudden onset of loss of speech or garbled speech and present now   Negative: Sounds like a life-threatening emergency to the triager   Negative: Confusion, disorientation, or hallucinations is main symptom   Negative:  Dizziness is main symptom   Negative: Followed a head injury within last 3 days   Negative: Headache (with neurologic deficit)   Negative: Unable to urinate (or only a few drops) and bladder feels very full   Negative: Loss of bladder or bowel control (urine or bowel incontinence; wetting self, leaking stool) of new-onset   Negative: Back pain with numbness (loss of sensation) in groin or rectal area   Negative: SEVERE difficulty breathing (e.g., struggling for each breath, speaks in single words)   Negative: Very weak (can't stand)   Negative: Sounds like a life-threatening emergency to the triager   Negative: Symptoms of COVID-19 (e.g., cough, fever, SOB, or others) and COVID-19 is widespread in the community   Negative: Symptoms of COVID-19 (e.g., cough, fever, SOB, or others) and within 14 days of COVID-19 EXPOSURE   Negative: Symptoms of FLU (e.g., cough, runny nose, SOB, sore throat; with or without fever) and within 14 days of EXPOSURE (close contact) with someone diagnosed with influenza (e.g., flu test positive)   Negative: Difficulty breathing and not from stuffy nose (e.g., not relieved by cleaning out the nose)   Negative: Runny nose is caused by pollen or other allergies   Negative: Cough is main symptom   Negative: Sore throat is main symptom   Negative: Fever > 103 F (39.4 C)   Negative: Fever > 101 F (38.3 C) and over 60 years of age   Negative: Fever > 100 F (37.8 C) and has diabetes mellitus or a weak immune system (e.g., HIV positive, cancer chemotherapy, organ transplant, splenectomy, chronic steroids)   Negative: Fever > 100 F (37.8 C) and bedridden (e.g., CVA, chronic illness, recovering from surgery)   Negative: Patient sounds very sick or weak to the triager   Negative: Neurologic deficit that was brief (now gone), ANY of the following: * Weakness of the face, arm, or leg on one side of the body * Numbness of the face, arm, or leg on one side of the body * Loss of speech or garbled speech    Negative: Patient sounds very sick or weak to the triager   Negative: Neurologic deficit of gradual onset (e.g., days to weeks), ANY of the following: * Weakness of the face, arm, or leg on one side of the body* Numbness of the face, arm, or leg on one side of the body* Loss of speech or garbled speech   Negative: [place] palsy suspected (i.e., weakness only one side of the face, developing over hours to days, no other symptoms)   Negative: Fever present > 3 days (72 hours)   Negative: Fever returns after gone for over 24 hours and symptoms worse or not improved   Negative: Tingling (e.g., pins and needles) of the face, arm or leg on one side of the body, that is present now  (Exceptions: Chronic or recurrent symptom lasting > 4 weeks; or from known cause, such as: bumped elbow, carpal tunnel, pinched nerve.)   Negative: Neck pain (with neurologic deficit)   Negative: Back pain (with neurologic deficit)    Protocols used: Neurologic Deficit-A-OH, Common Cold-A-OH

## 2025-01-09 ENCOUNTER — OFFICE VISIT (OUTPATIENT)
Dept: FAMILY MEDICINE | Facility: CLINIC | Age: 30
End: 2025-01-09
Payer: COMMERCIAL

## 2025-01-09 VITALS
HEART RATE: 89 BPM | WEIGHT: 159 LBS | TEMPERATURE: 98.9 F | DIASTOLIC BLOOD PRESSURE: 68 MMHG | RESPIRATION RATE: 18 BRPM | OXYGEN SATURATION: 98 % | SYSTOLIC BLOOD PRESSURE: 112 MMHG | BODY MASS INDEX: 26.49 KG/M2 | HEIGHT: 65 IN

## 2025-01-09 DIAGNOSIS — F17.200 NICOTINE DEPENDENCE, UNCOMPLICATED, UNSPECIFIED NICOTINE PRODUCT TYPE: ICD-10-CM

## 2025-01-09 DIAGNOSIS — R20.0 NUMBNESS AND TINGLING: ICD-10-CM

## 2025-01-09 DIAGNOSIS — Z13.9 ENCOUNTER FOR SCREENING INVOLVING SOCIAL DETERMINANTS OF HEALTH (SDOH): ICD-10-CM

## 2025-01-09 DIAGNOSIS — Z11.3 ENCNTR SCREEN FOR INFECTIONS W SEXL MODE OF TRANSMISS: ICD-10-CM

## 2025-01-09 DIAGNOSIS — Z11.4 SCREENING FOR HUMAN IMMUNODEFICIENCY VIRUS: ICD-10-CM

## 2025-01-09 DIAGNOSIS — Z30.015 ENCOUNTER FOR INITIAL PRESCRIPTION OF VAGINAL RING HORMONAL CONTRACEPTIVE: ICD-10-CM

## 2025-01-09 DIAGNOSIS — Z00.00 ROUTINE GENERAL MEDICAL EXAMINATION AT A HEALTH CARE FACILITY: Primary | ICD-10-CM

## 2025-01-09 DIAGNOSIS — R20.2 NUMBNESS AND TINGLING: ICD-10-CM

## 2025-01-09 PROBLEM — Z34.90 ENCOUNTER FOR INDUCTION OF LABOR: Status: RESOLVED | Noted: 2023-01-29 | Resolved: 2025-01-09

## 2025-01-09 PROBLEM — R19.8 GI SYMPTOMS: Status: RESOLVED | Noted: 2022-03-04 | Resolved: 2025-01-09

## 2025-01-09 LAB
ANION GAP SERPL CALCULATED.3IONS-SCNC: 9 MMOL/L (ref 7–15)
BUN SERPL-MCNC: 6 MG/DL (ref 6–20)
CALCIUM SERPL-MCNC: 9.7 MG/DL (ref 8.8–10.4)
CHLORIDE SERPL-SCNC: 102 MMOL/L (ref 98–107)
CREAT SERPL-MCNC: 0.69 MG/DL (ref 0.51–0.95)
EGFRCR SERPLBLD CKD-EPI 2021: >90 ML/MIN/1.73M2
ERYTHROCYTE [DISTWIDTH] IN BLOOD BY AUTOMATED COUNT: 13.3 % (ref 10–15)
FOLATE SERPL-MCNC: 27.6 NG/ML (ref 4.6–34.8)
GLUCOSE SERPL-MCNC: 91 MG/DL (ref 70–99)
HCO3 SERPL-SCNC: 28 MMOL/L (ref 22–29)
HCT VFR BLD AUTO: 38.3 % (ref 35–47)
HGB BLD-MCNC: 12.7 G/DL (ref 11.7–15.7)
HIV 1+2 AB+HIV1 P24 AG SERPL QL IA: NONREACTIVE
MCH RBC QN AUTO: 29.7 PG (ref 26.5–33)
MCHC RBC AUTO-ENTMCNC: 33.2 G/DL (ref 31.5–36.5)
MCV RBC AUTO: 90 FL (ref 78–100)
PLATELET # BLD AUTO: 301 10E3/UL (ref 150–450)
POTASSIUM SERPL-SCNC: 4.3 MMOL/L (ref 3.4–5.3)
RBC # BLD AUTO: 4.27 10E6/UL (ref 3.8–5.2)
SODIUM SERPL-SCNC: 139 MMOL/L (ref 135–145)
VIT B12 SERPL-MCNC: 601 PG/ML (ref 232–1245)
WBC # BLD AUTO: 6.5 10E3/UL (ref 4–11)

## 2025-01-09 PROCEDURE — 87491 CHLMYD TRACH DNA AMP PROBE: CPT | Performed by: PHYSICIAN ASSISTANT

## 2025-01-09 PROCEDURE — 99214 OFFICE O/P EST MOD 30 MIN: CPT | Mod: 25 | Performed by: PHYSICIAN ASSISTANT

## 2025-01-09 PROCEDURE — 80048 BASIC METABOLIC PNL TOTAL CA: CPT | Performed by: PHYSICIAN ASSISTANT

## 2025-01-09 PROCEDURE — 82746 ASSAY OF FOLIC ACID SERUM: CPT | Performed by: PHYSICIAN ASSISTANT

## 2025-01-09 PROCEDURE — 99385 PREV VISIT NEW AGE 18-39: CPT | Performed by: PHYSICIAN ASSISTANT

## 2025-01-09 PROCEDURE — 87591 N.GONORRHOEAE DNA AMP PROB: CPT | Performed by: PHYSICIAN ASSISTANT

## 2025-01-09 PROCEDURE — 85027 COMPLETE CBC AUTOMATED: CPT | Performed by: PHYSICIAN ASSISTANT

## 2025-01-09 PROCEDURE — 87389 HIV-1 AG W/HIV-1&-2 AB AG IA: CPT | Performed by: PHYSICIAN ASSISTANT

## 2025-01-09 PROCEDURE — 86780 TREPONEMA PALLIDUM: CPT | Performed by: PHYSICIAN ASSISTANT

## 2025-01-09 PROCEDURE — 82607 VITAMIN B-12: CPT | Performed by: PHYSICIAN ASSISTANT

## 2025-01-09 PROCEDURE — 36415 COLL VENOUS BLD VENIPUNCTURE: CPT | Performed by: PHYSICIAN ASSISTANT

## 2025-01-09 RX ORDER — BUPROPION HYDROCHLORIDE 150 MG/1
150 TABLET, FILM COATED, EXTENDED RELEASE ORAL 2 TIMES DAILY
Qty: 60 TABLET | Refills: 2 | Status: SHIPPED | OUTPATIENT
Start: 2025-02-08

## 2025-01-09 RX ORDER — BUPROPION HYDROCHLORIDE 150 MG/1
TABLET, FILM COATED, EXTENDED RELEASE ORAL
Qty: 57 TABLET | Refills: 0 | Status: SHIPPED | OUTPATIENT
Start: 2025-01-09 | End: 2025-02-08

## 2025-01-09 RX ORDER — NICOTINE 21 MG/24HR
1 PATCH, TRANSDERMAL 24 HOURS TRANSDERMAL EVERY 24 HOURS
Qty: 42 PATCH | Refills: 0 | Status: SHIPPED | OUTPATIENT
Start: 2025-01-09 | End: 2025-02-20

## 2025-01-09 RX ORDER — ETONOGESTREL AND ETHINYL ESTRADIOL VAGINAL RING .015; .12 MG/D; MG/D
RING VAGINAL
Qty: 3 EACH | Refills: 4 | Status: SHIPPED | OUTPATIENT
Start: 2025-01-09

## 2025-01-09 SDOH — HEALTH STABILITY: PHYSICAL HEALTH: ON AVERAGE, HOW MANY DAYS PER WEEK DO YOU ENGAGE IN MODERATE TO STRENUOUS EXERCISE (LIKE A BRISK WALK)?: 3 DAYS

## 2025-01-09 ASSESSMENT — PAIN SCALES - GENERAL: PAINLEVEL_OUTOF10: NO PAIN (0)

## 2025-01-09 ASSESSMENT — SOCIAL DETERMINANTS OF HEALTH (SDOH): HOW OFTEN DO YOU GET TOGETHER WITH FRIENDS OR RELATIVES?: ONCE A WEEK

## 2025-01-09 NOTE — PROGRESS NOTES
Preventive Care Visit  [facility]  Ally Mercado PA-C, Physician Assistant - Medical  Jan 9, 2025  {Provider  Link to SmartSet :614457}    {PROVIDER CHARTING PREFERENCE:460492}    Janina Waldrop is a 29 year old, presenting for the following:  Physical (Wants an std test, to talk about birth control and talk about quitting smoking ) and Cold Symptoms (Started a couple weeks ago got better and than it started again on Sunday )        1/9/2025    12:56 PM   Additional Questions   Roomed by Dipika BUTTS   Accompanied by Daughter      Palma here today for RHM    URI symptoms started Sunday night  Had numbness of hands at feet, lightheaded, body aches, blurry  Did wake with numbness of bilateral hands the other morning - took a few hours before returned to normal  Right hand ring finger will swell intermittently without clear cause   Also has intermittent knee pain but does have h/o knee injury in the past    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      1/9/2025   General Health   How would you rate your overall physical health? Good   Feel stress (tense, anxious, or unable to sleep) To some extent   (!) STRESS CONCERN      1/9/2025   Nutrition   Three or more servings of calcium each day? (!) NO   Diet: Regular (no restrictions)    Breakfast skipped   How many servings of fruit and vegetables per day? (!) 2-3   How many sweetened beverages each day? 0-1       Multiple values from one day are sorted in reverse-chronological order         1/9/2025   Exercise   Days per week of moderate/strenous  exercise 3 days         2025   Social Factors   Frequency of gathering with friends or relatives Once a week   Worry food won't last until get money to buy more Yes   Food not last or not have enough money for food? Yes   Do you have housing? (Housing is defined as stable permanent housing and does not include staying ouside in a car, in a tent, in an abandoned building, in an overnight shelter, or couch-surfing.) Yes   Are you worried about losing your housing? Yes   Lack of transportation? Yes   Unable to get utilities (heat,electricity)? No   Want help with housing or utility concern? (!) YES   (!) FOOD SECURITY CONCERN PRESENT (!) TRANSPORTATION CONCERN PRESENT(!) HOUSING CONCERN PRESENT      2025   Dental   Dentist two times every year? Yes         2025   TB Screening   Were you born outside of the [place]? No     Today's PHQ-2 Score:       2025    12:52 PM   PHQ-2 (  [employer])   Q1: Little interest or pleasure in doing things 0   Q2: Feeling down, depressed or hopeless 0   PHQ-2 Score 0    Q1: Little interest or pleasure in doing things Not at all   Q2: Feeling down, depressed or hopeless Not at all   PHQ-2 Score 0       Patient-reported         2025   Substance Use   Alcohol more than 3/day or more than 7/wk Not Applicable   Do you use any other substances recreationally? No     Social History     Tobacco Use     Smoking status: Former     Current packs/day: 0.00     Types: Cigarettes, Vaping Device     Quit date: 2023     Years since quittin.9     Smokeless tobacco: Never   Vaping Use     Vaping status: Never Used   Substance Use Topics     Alcohol use: No     Drug use: No     {Provider  If there are gaps in the social history shown above, please follow the link to update and then refresh the note Link to Social and Substance History :491047}     Mammogram Screening - Patient under 40 years of age: Routine Mammogram Screening not recommended.         2025   STI Screening   New  "sexual partner(s) since last STI/HIV test? (!) YES      History of abnormal Pap smear: No - age 21-29 PAP every 3 years recommended        3/16/2023     2:53 PM   PAP / HPV   PAP Negative for Intraepithelial Lesion or Malignancy (NILM)            1/9/2025   Contraception/Family Planning   Questions about contraception or family planning (!) YES      {Provider  REQUIRED FOR AWV Use the storyboard to review patient history, after sections have been marked as reviewed, refresh note to capture documentation:[id number]}   Reviewed and updated as needed this visit by Provider   Tobacco  Allergies  Meds  Problems  Med Hx  Surg Hx  Fam Hx               Objective    Exam  /68 (BP Location: Right arm, Patient Position: Sitting, Cuff Size: Adult Regular)   Pulse 89   Temp 98.9  F (37.2  C) (Temporal)   Resp 18   Ht 1.651 m (5' 5\")   Wt 72.1 kg (159 lb)   LMP 12/28/2024 (Exact Date)   SpO2 98%   Breastfeeding No   BMI 26.46 kg/m     Estimated body mass index is 26.46 kg/m  as calculated from the following:    Height as of this encounter: 1.651 m (5' 5\").    Weight as of this encounter: 72.1 kg (159 lb).    Physical Exam  {Exam Choices (Optional):402402}        Signed Electronically by: Ally Mercado PA-C  {Email feedback regarding this note to [email or web address]   :121077}  " "doing things 0   Q2: Feeling down, depressed or hopeless 0   PHQ-2 Score 0    Q1: Little interest or pleasure in doing things Not at all   Q2: Feeling down, depressed or hopeless Not at all   PHQ-2 Score 0       Patient-reported         2025   Substance Use   Alcohol more than 3/day or more than 7/wk Not Applicable   Do you use any other substances recreationally? No     Social History     Tobacco Use    Smoking status: Former     Current packs/day: 0.00     Types: Cigarettes, Vaping Device     Quit date: 2023     Years since quittin.9    Smokeless tobacco: Never   Vaping Use    Vaping status: Never Used   Substance Use Topics    Alcohol use: No    Drug use: No          Mammogram Screening - Patient under 40 years of age: Routine Mammogram Screening not recommended.         2025   STI Screening   New sexual partner(s) since last STI/HIV test? (!) YES      History of abnormal Pap smear: No - age 21-29 PAP every 3 years recommended        3/16/2023     2:53 PM   PAP / HPV   PAP Negative for Intraepithelial Lesion or Malignancy (NILM)            2025   Contraception/Family Planning   Questions about contraception or family planning (!) YES         Reviewed and updated as needed this visit by Provider   Tobacco  Allergies  Meds  Problems  Med Hx  Surg Hx  Fam Hx               Objective    Exam  /68 (BP Location: Right arm, Patient Position: Sitting, Cuff Size: Adult Regular)   Pulse 89   Temp 98.9  F (37.2  C) (Temporal)   Resp 18   Ht 1.651 m (5' 5\")   Wt 72.1 kg (159 lb)   LMP 2024 (Exact Date)   SpO2 98%   Breastfeeding No   BMI 26.46 kg/m     Estimated body mass index is 26.46 kg/m  as calculated from the following:    Height as of this encounter: 1.651 m (5' 5\").    Weight as of this encounter: 72.1 kg (159 lb).    Physical Exam  GENERAL: alert and no distress  EYES: Eyes grossly normal to inspection, PERRL and conjunctivae and sclerae normal  HENT: ear canals and " TM's normal, nose and mouth without ulcers or lesions  NECK: no adenopathy, no asymmetry, masses, or scars  RESP: lungs clear to auscultation - no rales, rhonchi or wheezes  CV: regular rate and rhythm, normal S1 S2, no S3 or S4, no murmur, click or rub, no peripheral edema  ABDOMEN: soft, nontender, no hepatosplenomegaly, no masses and bowel sounds normal  MS: no gross musculoskeletal defects noted, no edema  SKIN: no suspicious lesions or rashes  NEURO: Normal strength and tone, mentation intact and speech normal  PSYCH: mentation appears normal, affect normal/bright        Signed Electronically by: Ally Mercado PA-C

## 2025-01-09 NOTE — PATIENT INSTRUCTIONS
Patient Education   Preventive Care Advice   This is general advice given by our system to help you stay healthy. However, your care team may have specific advice just for you. Please talk to your care team about your preventive care needs.  Nutrition  Eat 5 or more servings of fruits and vegetables each day.  Try wheat bread, brown rice and whole grain pasta (instead of white bread, rice, and pasta).  Get enough calcium and vitamin D. Check the label on foods and aim for 100% of the RDA (recommended daily allowance).  Lifestyle  Exercise at least 150 minutes each week  (30 minutes a day, 5 days a week).  Do muscle strengthening activities 2 days a week. These help control your weight and prevent disease.  No smoking.  Wear sunscreen to prevent skin cancer.  Have a dental exam and cleaning every 6 months.  Yearly exams  See your health care team every year to talk about:  Any changes in your health.  Any medicines your care team has prescribed.  Preventive care, family planning, and ways to prevent chronic diseases.  Shots (vaccines)   HPV shots (up to age 26), if you've never had them before.  Hepatitis B shots (up to age 59), if you've never had them before.  COVID-19 shot: Get this shot when it's due.  Flu shot: Get a flu shot every year.  Tetanus shot: Get a tetanus shot every 10 years.  Pneumococcal, hepatitis A, and RSV shots: Ask your care team if you need these based on your risk.  Shingles shot (for age 50 and up)  General health tests  Diabetes screening:  Starting at age 35, Get screened for diabetes at least every 3 years.  If you are younger than age 35, ask your care team if you should be screened for diabetes.  Cholesterol test: At age 39, start having a cholesterol test every 5 years, or more often if advised.  Bone density scan (DEXA): At age 50, ask your care team if you should have this scan for osteoporosis (brittle bones).  Hepatitis C: Get tested at least once in your life.  STIs (sexually  transmitted infections)  Before age 24: Ask your care team if you should be screened for STIs.  After age 24: Get screened for STIs if you're at risk. You are at risk for STIs (including HIV) if:  You are sexually active with more than one person.  You don't use condoms every time.  You or a partner was diagnosed with a sexually transmitted infection.  If you are at risk for HIV, ask about PrEP medicine to prevent HIV.  Get tested for HIV at least once in your life, whether you are at risk for HIV or not.  Cancer screening tests  Cervical cancer screening: If you have a cervix, begin getting regular cervical cancer screening tests starting at age 21.  Breast cancer scan (mammogram): If you've ever had breasts, begin having regular mammograms starting at age 40. This is a scan to check for breast cancer.  Colon cancer screening: It is important to start screening for colon cancer at age 45.  Have a colonoscopy test every 10 years (or more often if you're at risk) Or, ask your provider about stool tests like a FIT test every year or Cologuard test every 3 years.  To learn more about your testing options, visit:   .  For help making a decision, visit:   https://bit.ly/on37900.  Prostate cancer screening test: If you have a prostate, ask your care team if a prostate cancer screening test (PSA) at age 55 is right for you.  Lung cancer screening: If you are a current or former smoker ages 50 to 80, ask your care team if ongoing lung cancer screenings are right for you.  For informational purposes only. Not to replace the advice of your health care provider. Copyright   2023 [facility]. All rights reserved. Clinically reviewed by the [facility]. [employer] 767038 - REV 01/24.  Learning About Stress  What is stress?     Stress is your body's response to a hard situation. Your body can have a physical, emotional, or mental response. Stress is a fact of life for most people, and it  affects everyone differently. What causes stress for you may not be stressful for someone else.  A lot of things can cause stress. You may feel stress when you go on a job interview, take a test, or run a race. This kind of short-term stress is normal and even useful. It can help you if you need to work hard or react quickly. For example, stress can help you finish an important job on time.  Long-term stress is caused by ongoing stressful situations or events. Examples of long-term stress include long-term health problems, ongoing problems at work, or conflicts in your family. Long-term stress can harm your health.  How does stress affect your health?  When you are stressed, your body responds as though you are in danger. It makes hormones that speed up your heart, make you breathe faster, and give you a burst of energy. This is called the fight-or-flight stress response. If the stress is over quickly, your body goes back to normal and no harm is done.  But if stress happens too often or lasts too long, it can have bad effects. Long-term stress can make you more likely to get sick, and it can make symptoms of some diseases worse. If you tense up when you are stressed, you may develop neck, shoulder, or low back pain. Stress is linked to high blood pressure and heart disease.  Stress also harms your emotional health. It can make you albarran, tense, or depressed. Your relationships may suffer, and you may not do well at work or school.  What can you do to manage stress?  You can try these things to help manage stress:   Do something active. Exercise or activity can help reduce stress. Walking is a great way to get started. Even everyday activities such as housecleaning or yard work can help.  Try yoga or selena chi. These techniques combine exercise and meditation. You may need some training at first to learn them.  Do something you enjoy. For example, listen to music or go to a movie. Practice your hobby or do volunteer  "work.  Meditate. This can help you relax, because you are not worrying about what happened before or what may happen in the future.  Do guided imagery. Imagine yourself in any setting that helps you feel calm. You can use online videos, books, or a teacher to guide you.  Do breathing exercises. For example:  From a standing position, bend forward from the waist with your knees slightly bent. Let your arms dangle close to the floor.  Breathe in slowly and deeply as you return to a standing position. Roll up slowly and lift your head last.  Hold your breath for just a few seconds in the standing position.  Breathe out slowly and bend forward from the waist.  Let your feelings out. Talk, laugh, cry, and express anger when you need to. Talking with supportive friends or family, a counselor, or a torsten leader about your feelings is a healthy way to relieve stress. Avoid discussing your feelings with people who make you feel worse.  Write. It may help to write about things that are bothering you. This helps you find out how much stress you feel and what is causing it. When you know this, you can find better ways to cope.  What can you do to prevent stress?  You might try some of these things to help prevent stress:  Manage your time. This helps you find time to do the things you want and need to do.  Get enough sleep. Your body recovers from the stresses of the day while you are sleeping.  Get support. Your family, friends, and community can make a difference in how you experience stress.  Limit your news feed. Avoid or limit time on social media or news that may make you feel stressed.  Do something active. Exercise or activity can help reduce stress. Walking is a great way to get started.  Where can you learn more?  Go to https://www.[employer].net/patiented  Enter N032 in the search box to learn more about \"Learning About Stress.\"  Current as of: October 24, 2023  Content Version: 14.3    2024 [employer]. "   Care instructions adapted under license by your healthcare professional. If you have questions about a medical condition or this instruction, always ask your healthcare professional. [employer] disclaims any warranty or liability for your use of this information.    Safer Sex: Care Instructions  Overview  Safer sex is a way to reduce your risk of getting a sexually transmitted infection (STI). It can also help prevent pregnancy.  Several products can help you practice safer sex and reduce your chance of STIs. One of the best is a condom. There are internal and external condoms. You can use a special rubber sheet (dental dam) for protection during oral sex. Disposable gloves can keep your hands from touching blood, semen, or other body fluids that can carry infections.  Remember that birth control methods such as diaphragms, IUDs, foams, and birth control pills do not stop you from getting STIs.  Follow-up care is a key part of your treatment and safety. Be sure to make and go to all appointments, and call your doctor if you are having problems. It's also a good idea to know your test results and keep a list of the medicines you take.  How can you care for yourself at home?  Think about getting vaccinated to help prevent hepatitis A, hepatitis B, and human papillomavirus (HPV). They can be spread through sex.  Use a condom every time you have sex. Use an external condom, which goes on the penis. Or use an internal condom, which goes into the vagina or anus.  Make sure you use the right size external condom. A condom that's too small can break easily. A condom that's too big can slip off during sex.  Use a new condom each time you have sex. Be careful not to poke a hole in the condom when you open the wrapper.  Don't use an internal condom and an external condom at the same time.  Never use petroleum jelly (such as Vaseline), grease, hand lotion, baby oil, or anything with oil in it. These products can  "make holes in the condom.  After intercourse, hold the edge of the condom as you remove it. This will help keep semen from spilling out of the condom.  Do not have sex with anyone who has symptoms of an STI, such as sores on the genitals or mouth.  Do not drink a lot of alcohol or use drugs before sex.  Limit your sex partners. Sex with one partner who has sex only with you can reduce your risk of getting an STI.  Don't share sex toys. But if you do share them, use a condom and clean the sex toys between each use.  Talk to any partners before you have sex. Talk about what you feel comfortable with and whether you have any boundaries with sex. And find out if your partner or partners may be at risk for any STI. Keep in mind that a person may be able to spread an STI even if they do not have symptoms. You and any partners may want to get tested for STIs.  Where can you learn more?  Go to https://www.[employer].net/patiented  Enter B608 in the search box to learn more about \"Safer Sex: Care Instructions.\"  Current as of: April 30, 2024  Content Version: 14.3    2024 [email or web address].   Care instructions adapted under license by your healthcare professional. If you have questions about a medical condition or this instruction, always ask your healthcare professional. [email or web address] disclaims any warranty or liability for your use of this information.           Nicotine Transdermal System   Habitrol, Nicoderm C-Q    Uses  For quitting smoking.    Instructions  DO NOT take this medicine by mouth.    Avoid placing the patch near the breast.    Remove the patch after 24 hours.    Keep the medicine at room temperature. Avoid heat and direct light.    This patch should not be cut.    Wash your hands before and after handling this medicine.    Remove old patch before applying new one. Change the location of the new patch.    If you have vivid dreams or trouble sleeping, you may remove the patch before going to " sleep.    Ask your doctor or pharmacist about locations on your body where this patch can be used.    Remove the plastic liner that protects the sticky side of the patch before applying to the skin.    Be sure the area of skin is clean and dry before putting on a new patch.    Apply the patch to a clean, dry, hairless area.    Press the patch firmly for a few seconds to make sure it stays in place.    After removing the patch, fold it together and discard it out of reach of children and pets.    Please ask your doctor or pharmacist how you can safely dispose of used patches.    If the skin under the patch becomes irritated, remove the patch. Do not apply a new patch to the area until the skin feels better.    To avoid irritating your skin, use a different location for a new patch.    Apply the patch only to normal looking skin. Avoid areas of the skin that are red, have scrapes, or damaged.    If the patch falls off, apply a new a patch on a different location of the body.    Please tell your doctor and pharmacist about all the medicines you take. Include both prescription and over-the-counter medicines. Also tell them about any vitamins, herbal medicines, or anything else you take for your health.    If you need to stop this medicine, your doctor may wish to gradually reduce the dosage before stopping.    Do not use more than 1 patch at any one time.    Cautions  Tell your doctor and pharmacist if you ever had an allergic reaction to a medicine. Symptoms of an allergic reaction can include trouble breathing, skin rash, itching, swelling, or severe dizziness.    Do not use the medication any more than instructed.    Avoid smoking while on this medicine. Smoking may increase your risk for stroke, heart attack, blood clots, high blood pressure, and other diseases of the heart and blood vessels.    Tell the doctor or pharmacist if you are pregnant, planning to be pregnant, or breastfeeding.    Ask your pharmacist if  this medicine can interact with any of your other medicines. Be sure to tell them about all the medicines you take.    Please tell all your doctors and dentists that you are on this medicine before they provide care.    Side Effects  The following is a list of some common side effects from this medicine. Please speak with your doctor about what you should do if you experience these or other side effects.    skin irritation where medicine is applied    If you have any of the following side effects, you may be getting too much medicine. Please contact your doctor to let them know about these side effects.    diarrhea  dizziness  nausea  rapid heartbeat  vomiting    A few people may have an allergic reaction to this medicine. Symptoms can include difficulty breathing, skin rash, itching, swelling, or severe dizziness. If you notice any of these symptoms, seek medical help quickly.    Extra  Please speak with your doctor, nurse, or pharmacist if you have any questions about this medicine.      [email or web address]  IMPORTANT NOTE: This document tells you briefly how to take your medicine, but it does not tell you all there is to know about it. Your doctor or pharmacist may give you other documents about your medicine. Please talk to them if you have any questions. Always follow their advice. There is a more complete description of this medicine available in English. Scan this code on your smartphone or tablet or use the web address below. You can also ask your pharmacist for a printout. If you have any questions, please ask your pharmacist.   2021 [employer]      6920-5901 [employer]. All rights reserved. This information is not intended as a substitute for professional medical care. Always follow your healthcare professional's instructions.

## 2025-01-10 LAB
C TRACH DNA SPEC QL PROBE+SIG AMP: NEGATIVE
N GONORRHOEA DNA SPEC QL NAA+PROBE: NEGATIVE
SPECIMEN TYPE: NORMAL
T PALLIDUM AB SER QL: NONREACTIVE

## 2025-01-10 NOTE — RESULT ENCOUNTER NOTE
Dear Palma,    Your blood cell counts, electrolytes, kidney function and blood sugar were normal.  Folate & B12 levels were normal.  Your STI test results were negative (chlamydia, gonorrhea, syphilis, and HIV).      Take care,  Ally Mercado PA-C

## 2025-01-16 ENCOUNTER — PATIENT OUTREACH (OUTPATIENT)
Dept: FAMILY MEDICINE | Facility: CLINIC | Age: 30
End: 2025-01-16
Payer: COMMERCIAL

## 2025-01-16 NOTE — PROGRESS NOTES
Clinic Care Coordination Contact    Call placed to pt and she was looking for emergency rental assistance.  She has had challenges with paying rent since November and been denied assistance from the [facility] and many other locations due to no funds or because she had no way to continue to pay her rent.  She now has a part time job and has reapplied for [facility] emergency assistance.     Pt stated she has called many places without any luck and each option Sw discussed she said she has tried.  She did not want numbers to call as she has been trying since November.  She got some assistance for November and December ($642 per month) and her rent is $1238 so she has behind rent, penalties and current month rent that is due.  She did just get a part time job and is applying for assistance from the [facility] again since she now has some income.      RYANCASANDRA [facility]  if live in [facility] limits    Are two resources sent via [employer] that may help.  She was also given the numbers for [facility] [employer] [facility] for rent assistance and utilities.  She feels she has asked them before and will consider.     Will send introduction letter and the two resources found after the phone call via [employer].     At this time will not call unless pt calls again as she was not looking for more numbers to call and was looking for sure thing assistance.  She was not willing to consider coordinated entry or similar resources as she has to be homeless or in a shelter and stated she is not going back there.     Did attempt to discuss [employer] , [employer] charities, and [employer] all of which she has tried.    Evonne Hathaway, [facility] - Care Coordination  [facility]   [phone]